# Patient Record
Sex: MALE | Race: WHITE | Employment: FULL TIME | URBAN - METROPOLITAN AREA
[De-identification: names, ages, dates, MRNs, and addresses within clinical notes are randomized per-mention and may not be internally consistent; named-entity substitution may affect disease eponyms.]

---

## 2017-01-22 ENCOUNTER — OFFICE VISIT (OUTPATIENT)
Dept: FAMILY MEDICINE CLINIC | Facility: CLINIC | Age: 62
End: 2017-01-22

## 2017-01-22 VITALS
SYSTOLIC BLOOD PRESSURE: 132 MMHG | HEART RATE: 84 BPM | HEIGHT: 68 IN | BODY MASS INDEX: 28.04 KG/M2 | DIASTOLIC BLOOD PRESSURE: 74 MMHG | RESPIRATION RATE: 21 BRPM | WEIGHT: 185 LBS | OXYGEN SATURATION: 98 % | TEMPERATURE: 98 F

## 2017-01-22 DIAGNOSIS — R05.9 COUGH: ICD-10-CM

## 2017-01-22 DIAGNOSIS — J06.9 UPPER RESPIRATORY TRACT INFECTION, UNSPECIFIED TYPE: Primary | ICD-10-CM

## 2017-01-22 PROCEDURE — 99213 OFFICE O/P EST LOW 20 MIN: CPT | Performed by: PHYSICIAN ASSISTANT

## 2017-01-22 RX ORDER — AZITHROMYCIN 250 MG/1
TABLET, FILM COATED ORAL
Qty: 6 TABLET | Refills: 0 | Status: SHIPPED | OUTPATIENT
Start: 2017-01-22 | End: 2017-06-30 | Stop reason: ALTCHOICE

## 2017-01-22 NOTE — PATIENT INSTRUCTIONS
Adult Self-Care for Colds  Colds are caused by viruses. They can’t be cured with antibiotics. However, you can relieve symptoms and support your body’s efforts to heal itself.  No matter which symptoms you have, be sure to drink plenty of fluids (water or When you first notice symptoms, ask your health care provider if antiviral medications are appropriate. Antibiotics should not be taken for colds or flu.  Also, call your doctor if you have any of the following symptoms or if you aren’t feeling better after

## 2017-01-22 NOTE — PROGRESS NOTES
CHIEF COMPLAINT:   Patient presents with:  Cough: x4 days    HPI:   Renee Shah is a 62year old male who presents for upper and lower respiratory symptoms for  4 days.  Patient reports sore throat only at the beginning of sx's, thick, yellow colore HEAD: atraumatic, normocephalic. No tenderness on palpation of maxillary or frontal sinuses.   EYES: conjunctiva clear, EOM intact  EARS: TM's pearly, no bulging, no retraction, no fluid, bony landmarks appear normal.   NOSE: Nostrils patent, mild, thick n · Breathe steam or heated humidified air to open blocked nasal passages.  a hot shower or use a vaporizer. Be careful not to get burned by the steam.  · Saline nasal sprays and decongestant tablets help open a stuffy nose.  Antihistamines can also h © 3432-9181 [address], [address] [place]. All rights reserved. This information is not intended as a substitute for professional medical care. Always follow your healthcare professional's instructions.               Torsten Graham

## 2017-03-13 RX ORDER — LISINOPRIL 10 MG/1
TABLET ORAL
Qty: 90 TABLET | Refills: 0 | Status: SHIPPED | OUTPATIENT
Start: 2017-03-13 | End: 2017-07-31

## 2017-06-26 ENCOUNTER — TELEPHONE (OUTPATIENT)
Dept: FAMILY MEDICINE CLINIC | Facility: CLINIC | Age: 62
End: 2017-06-26

## 2017-06-26 DIAGNOSIS — Z00.00 ROUTINE PHYSICAL EXAMINATION: Primary | ICD-10-CM

## 2017-06-26 NOTE — TELEPHONE ENCOUNTER
Call to pt reaches voice mail. Left vmm advising requested edward fasting orders will be placed later today. Advised to call back to triage nurse if any other questions.    **see lab orders pended for review-thanks**

## 2017-06-26 NOTE — TELEPHONE ENCOUNTER
Pt currently has a physical scheduled this Friday, 6/30. Pt is asking to complete labs, if needed, prior to the appt. Please advise.

## 2017-06-29 ENCOUNTER — LAB ENCOUNTER (OUTPATIENT)
Dept: LAB | Age: 62
End: 2017-06-29
Attending: FAMILY MEDICINE
Payer: COMMERCIAL

## 2017-06-29 DIAGNOSIS — R73.02 GLUCOSE INTOLERANCE (IMPAIRED GLUCOSE TOLERANCE): Primary | ICD-10-CM

## 2017-06-29 DIAGNOSIS — R79.0 LOW FERRITIN: ICD-10-CM

## 2017-06-29 DIAGNOSIS — Z00.00 ROUTINE PHYSICAL EXAMINATION: ICD-10-CM

## 2017-06-29 DIAGNOSIS — R73.02 GLUCOSE INTOLERANCE (IMPAIRED GLUCOSE TOLERANCE): ICD-10-CM

## 2017-06-29 LAB
ALBUMIN SERPL-MCNC: 3.6 G/DL (ref 3.5–4.8)
ALP LIVER SERPL-CCNC: 29 U/L (ref 45–117)
ALT SERPL-CCNC: 40 U/L (ref 17–63)
AST SERPL-CCNC: 22 U/L (ref 15–41)
BASOPHILS # BLD AUTO: 0.04 X10(3) UL (ref 0–0.1)
BASOPHILS NFR BLD AUTO: 0.8 %
BILIRUB SERPL-MCNC: 0.7 MG/DL (ref 0.1–2)
BUN BLD-MCNC: 13 MG/DL (ref 8–20)
CALCIUM BLD-MCNC: 8.6 MG/DL (ref 8.3–10.3)
CHLORIDE: 107 MMOL/L (ref 101–111)
CHOLEST SMN-MCNC: 223 MG/DL (ref ?–200)
CO2: 25 MMOL/L (ref 22–32)
COMPLEXED PSA SERPL-MCNC: 1.4 NG/ML (ref 0.01–4)
CREAT BLD-MCNC: 0.96 MG/DL (ref 0.7–1.3)
EOSINOPHIL # BLD AUTO: 0.27 X10(3) UL (ref 0–0.3)
EOSINOPHIL NFR BLD AUTO: 5.1 %
ERYTHROCYTE [DISTWIDTH] IN BLOOD BY AUTOMATED COUNT: 12.4 % (ref 11.5–16)
EST. AVERAGE GLUCOSE BLD GHB EST-MCNC: 120 MG/DL (ref 68–126)
GLUCOSE BLD-MCNC: 106 MG/DL (ref 70–99)
HBA1C MFR BLD HPLC: 5.8 % (ref ?–5.7)
HCT VFR BLD AUTO: 43.6 % (ref 37–53)
HDLC SERPL-MCNC: 43 MG/DL (ref 45–?)
HDLC SERPL: 5.19 {RATIO} (ref ?–4.97)
HGB BLD-MCNC: 15 G/DL (ref 13–17)
IMMATURE GRANULOCYTE COUNT: 0.03 X10(3) UL (ref 0–1)
IMMATURE GRANULOCYTE RATIO %: 0.6 %
LDLC SERPL CALC-MCNC: 133 MG/DL (ref ?–130)
LYMPHOCYTES # BLD AUTO: 1.46 X10(3) UL (ref 0.9–4)
LYMPHOCYTES NFR BLD AUTO: 27.7 %
M PROTEIN MFR SERPL ELPH: 7 G/DL (ref 6.1–8.3)
MCH RBC QN AUTO: 31.7 PG (ref 27–33.2)
MCHC RBC AUTO-ENTMCNC: 34.4 G/DL (ref 31–37)
MCV RBC AUTO: 92.2 FL (ref 80–99)
MONOCYTES # BLD AUTO: 0.59 X10(3) UL (ref 0.1–0.6)
MONOCYTES NFR BLD AUTO: 11.2 %
NEUTROPHIL ABS PRELIM: 2.88 X10 (3) UL (ref 1.3–6.7)
NEUTROPHILS # BLD AUTO: 2.88 X10(3) UL (ref 1.3–6.7)
NEUTROPHILS NFR BLD AUTO: 54.6 %
NONHDLC SERPL-MCNC: 180 MG/DL (ref ?–130)
PLATELET # BLD AUTO: 170 10(3)UL (ref 150–450)
POTASSIUM SERPL-SCNC: 4 MMOL/L (ref 3.6–5.1)
RBC # BLD AUTO: 4.73 X10(6)UL (ref 4.3–5.7)
RED CELL DISTRIBUTION WIDTH-SD: 42.1 FL (ref 35.1–46.3)
SODIUM SERPL-SCNC: 141 MMOL/L (ref 136–144)
TRIGLYCERIDES: 236 MG/DL (ref ?–150)
TSI SER-ACNC: 1.71 MIU/ML (ref 0.35–5.5)
VLDL: 47 MG/DL (ref 5–40)
WBC # BLD AUTO: 5.3 X10(3) UL (ref 4–13)

## 2017-06-29 PROCEDURE — 85025 COMPLETE CBC W/AUTO DIFF WBC: CPT

## 2017-06-29 PROCEDURE — 36415 COLL VENOUS BLD VENIPUNCTURE: CPT

## 2017-06-29 PROCEDURE — 82728 ASSAY OF FERRITIN: CPT

## 2017-06-29 PROCEDURE — 80061 LIPID PANEL: CPT

## 2017-06-29 PROCEDURE — 84443 ASSAY THYROID STIM HORMONE: CPT

## 2017-06-29 PROCEDURE — 80053 COMPREHEN METABOLIC PANEL: CPT

## 2017-06-29 PROCEDURE — 83036 HEMOGLOBIN GLYCOSYLATED A1C: CPT

## 2017-06-30 ENCOUNTER — OFFICE VISIT (OUTPATIENT)
Dept: FAMILY MEDICINE CLINIC | Facility: CLINIC | Age: 62
End: 2017-06-30

## 2017-06-30 VITALS
WEIGHT: 186 LBS | BODY MASS INDEX: 28.85 KG/M2 | SYSTOLIC BLOOD PRESSURE: 130 MMHG | TEMPERATURE: 98 F | OXYGEN SATURATION: 99 % | HEIGHT: 67.5 IN | DIASTOLIC BLOOD PRESSURE: 80 MMHG | HEART RATE: 77 BPM | RESPIRATION RATE: 16 BRPM

## 2017-06-30 DIAGNOSIS — Z23 NEED FOR VACCINATION: ICD-10-CM

## 2017-06-30 DIAGNOSIS — Z00.00 ROUTINE GENERAL MEDICAL EXAMINATION AT A HEALTH CARE FACILITY: Primary | ICD-10-CM

## 2017-06-30 DIAGNOSIS — R79.0 LOW FERRITIN: ICD-10-CM

## 2017-06-30 DIAGNOSIS — M25.512 LEFT SHOULDER PAIN, UNSPECIFIED CHRONICITY: ICD-10-CM

## 2017-06-30 LAB — DEPRECATED HBV CORE AB SER IA-ACNC: 48 NG/ML (ref 22–322)

## 2017-06-30 PROCEDURE — 90715 TDAP VACCINE 7 YRS/> IM: CPT | Performed by: FAMILY MEDICINE

## 2017-06-30 PROCEDURE — 99396 PREV VISIT EST AGE 40-64: CPT | Performed by: FAMILY MEDICINE

## 2017-06-30 PROCEDURE — 90471 IMMUNIZATION ADMIN: CPT | Performed by: FAMILY MEDICINE

## 2017-06-30 NOTE — PROGRESS NOTES
CHIEF COMPLAINT   Melba Aragon is a 65year old male who presents for a complete physical exam.   HPI:   Pt complains of left ear clogged. Left shoulder pain again - couple years ago did PT with relief. Pain again for 6 months.       Wt Readings f HISTORY      Comment: Fiberoptic Examinations Gastroscopy   Family History   Problem Relation Age of Onset   • Arthritis Father    • High Blood Pressure Father    • Other[other] [OTHER] Father      spinal stenosis   • High Cholesterol Mother    • Cancer Br intact    ASSESSMENT AND PLAN:   Chris Chris is a 65year old male who presents for a complete physical exam. Pt's weight is There is no height or weight on file to calculate BMI., recommended low fat diet and aerobic exercise 30 minutes three time

## 2017-07-31 RX ORDER — LISINOPRIL 10 MG/1
TABLET ORAL
Qty: 90 TABLET | Refills: 0 | OUTPATIENT
Start: 2017-07-31 | End: 2017-11-13

## 2017-07-31 NOTE — TELEPHONE ENCOUNTER
Received call from [facility] at [address] requesting a refill of pt's lisinopril. Verbal given and documented.

## 2017-10-03 ENCOUNTER — OFFICE VISIT (OUTPATIENT)
Dept: SURGERY | Facility: CLINIC | Age: 62
End: 2017-10-03

## 2017-10-03 VITALS — HEIGHT: 68 IN | TEMPERATURE: 99 F | BODY MASS INDEX: 27.58 KG/M2 | WEIGHT: 182 LBS

## 2017-10-03 DIAGNOSIS — K64.3 PROLAPSED INTERNAL HEMORRHOIDS, GRADE 4: Primary | ICD-10-CM

## 2017-10-03 DIAGNOSIS — K64.4 EXTERNAL PROLAPSED HEMORRHOIDS: ICD-10-CM

## 2017-10-03 DIAGNOSIS — N40.2 PROSTATE NODULE: ICD-10-CM

## 2017-10-03 DIAGNOSIS — K92.2 INTESTINAL BLEEDING: ICD-10-CM

## 2017-10-03 PROCEDURE — 99214 OFFICE O/P EST MOD 30 MIN: CPT | Performed by: COLON & RECTAL SURGERY

## 2017-10-03 PROCEDURE — 46600 DIAGNOSTIC ANOSCOPY SPX: CPT | Performed by: COLON & RECTAL SURGERY

## 2017-10-03 RX ORDER — POLYETHYLENE GLYCOL 3350, SODIUM CHLORIDE, SODIUM BICARBONATE, POTASSIUM CHLORIDE 420; 11.2; 5.72; 1.48 G/4L; G/4L; G/4L; G/4L
POWDER, FOR SOLUTION ORAL
Qty: 1 BOTTLE | Refills: 0 | Status: SHIPPED | OUTPATIENT
Start: 2017-10-03 | End: 2017-10-27

## 2017-10-04 NOTE — PROGRESS NOTES
Follow Up Visit Note       Active Problems      1. Prolapsed internal hemorrhoids, grade 4    2. Prostate nodule    3. External prolapsed hemorrhoids    4.  Intestinal bleeding          Chief Complaint   Patient presents with:  Hemorrhoids: Was seen last fa • Sprain of neck    • Sprain of thoracic region    • Unspecified disorder of skin and subcutaneous tissue      Past Surgical History:  03/13/2009: COLONOSCOPY  03/13/2009: OTHER SURGICAL HISTORY      Comment: Fiberoptic Examinations Gastroscopy    The fa vomiting. Genitourinary: Negative for difficulty urinating, dysuria, frequency and urgency. Musculoskeletal: Negative for arthralgias and myalgias. Skin: Negative for color change and rash. Neurological: Negative for tremors, syncope and weakness. not yet been completed. The patient has had advancing symptoms of his hemorrhoidal disease with prolapse and pressure. He has not had much bleeding since the episodes of last year that were quite profound. He reports no abdominal pain or complaints. a significant amount of hemorrhoidal tissues. We will do our best to remove as much hemorrhoid tissue as it is possible. This may leave him with small amounts of residual hemorrhoidal tissues that should not flare, thrombosed, or prolapse.   We simply can

## 2017-10-04 NOTE — PROCEDURES
Procedure:  Anoscopy    Surgeon: Susi Diallo    Anesthesia: None    Findings: See the progress note attached for all findings    Operative Summary: The patient was placed in a prone position on the proctoscopy table, the hips were flexed in the jackknife p

## 2017-10-04 NOTE — PATIENT INSTRUCTIONS
This patient was seen last fall for a combination of internal/external prolapsing hemorrhoids that will require surgical intervention. At that time the patient was advised to undergo colonoscopy based on a profound bleeding episode.   That study has not seen Dr. Becka Ball in the past, I have informed him that Dr. Becka Ball is retired. His visit with Dr. Becka Ball was possibly even greater than 20 years ago. This patient would definitely benefit from excisional hemorrhoidectomy in 3 locations.   He has a s

## 2017-10-07 PROCEDURE — 87086 URINE CULTURE/COLONY COUNT: CPT | Performed by: UROLOGY

## 2017-10-07 PROCEDURE — 81015 MICROSCOPIC EXAM OF URINE: CPT | Performed by: UROLOGY

## 2017-10-14 ENCOUNTER — APPOINTMENT (OUTPATIENT)
Dept: LAB | Age: 62
End: 2017-10-14
Payer: COMMERCIAL

## 2017-10-14 ENCOUNTER — EKG ENCOUNTER (OUTPATIENT)
Dept: LAB | Age: 62
End: 2017-10-14
Payer: COMMERCIAL

## 2017-10-14 DIAGNOSIS — K64.4 EXTERNAL PROLAPSED HEMORRHOIDS: ICD-10-CM

## 2017-10-14 DIAGNOSIS — K64.3 PROLAPSED INTERNAL HEMORRHOIDS, GRADE 4: ICD-10-CM

## 2017-10-14 DIAGNOSIS — I10 BENIGN ESSENTIAL HYPERTENSION: ICD-10-CM

## 2017-10-14 PROCEDURE — 80048 BASIC METABOLIC PNL TOTAL CA: CPT

## 2017-10-14 PROCEDURE — 93010 ELECTROCARDIOGRAM REPORT: CPT | Performed by: INTERNAL MEDICINE

## 2017-10-14 PROCEDURE — 36415 COLL VENOUS BLD VENIPUNCTURE: CPT

## 2017-10-14 PROCEDURE — 93005 ELECTROCARDIOGRAM TRACING: CPT | Performed by: INTERNAL MEDICINE

## 2017-10-24 ENCOUNTER — LABORATORY ENCOUNTER (OUTPATIENT)
Dept: LAB | Age: 62
End: 2017-10-24
Attending: COLON & RECTAL SURGERY
Payer: COMMERCIAL

## 2017-10-24 DIAGNOSIS — K92.2 INTESTINAL BLEEDING: Primary | ICD-10-CM

## 2017-10-24 PROCEDURE — 88305 TISSUE EXAM BY PATHOLOGIST: CPT

## 2017-10-26 ENCOUNTER — ANESTHESIA EVENT (OUTPATIENT)
Dept: SURGERY | Facility: HOSPITAL | Age: 62
End: 2017-10-26

## 2017-10-27 ENCOUNTER — ANESTHESIA (OUTPATIENT)
Dept: SURGERY | Facility: HOSPITAL | Age: 62
End: 2017-10-27

## 2017-10-27 ENCOUNTER — HOSPITAL ENCOUNTER (OUTPATIENT)
Facility: HOSPITAL | Age: 62
Setting detail: HOSPITAL OUTPATIENT SURGERY
Discharge: HOME | End: 2017-10-27
Attending: COLON & RECTAL SURGERY | Admitting: COLON & RECTAL SURGERY
Payer: COMMERCIAL

## 2017-10-27 ENCOUNTER — SURGERY (OUTPATIENT)
Age: 62
End: 2017-10-27

## 2017-10-27 VITALS
HEART RATE: 62 BPM | WEIGHT: 183.31 LBS | OXYGEN SATURATION: 95 % | SYSTOLIC BLOOD PRESSURE: 120 MMHG | BODY MASS INDEX: 27.78 KG/M2 | TEMPERATURE: 98 F | HEIGHT: 68 IN | RESPIRATION RATE: 18 BRPM | DIASTOLIC BLOOD PRESSURE: 80 MMHG

## 2017-10-27 DIAGNOSIS — I10 BENIGN ESSENTIAL HYPERTENSION: Primary | ICD-10-CM

## 2017-10-27 DIAGNOSIS — K64.3 PROLAPSED INTERNAL HEMORRHOIDS, GRADE 4: ICD-10-CM

## 2017-10-27 DIAGNOSIS — K64.4 EXTERNAL PROLAPSED HEMORRHOIDS: ICD-10-CM

## 2017-10-27 PROCEDURE — 88304 TISSUE EXAM BY PATHOLOGIST: CPT | Performed by: COLON & RECTAL SURGERY

## 2017-10-27 PROCEDURE — 06BY0ZC EXCISION OF HEMORRHOIDAL PLEXUS, OPEN APPROACH: ICD-10-PCS | Performed by: COLON & RECTAL SURGERY

## 2017-10-27 RX ORDER — HYDROMORPHONE HYDROCHLORIDE 1 MG/ML
0.4 INJECTION, SOLUTION INTRAMUSCULAR; INTRAVENOUS; SUBCUTANEOUS EVERY 5 MIN PRN
Status: DISCONTINUED | OUTPATIENT
Start: 2017-10-27 | End: 2017-10-27

## 2017-10-27 RX ORDER — CLINDAMYCIN PHOSPHATE 900 MG/50ML
INJECTION INTRAVENOUS
Status: DISCONTINUED | OUTPATIENT
Start: 2017-10-27 | End: 2017-10-27

## 2017-10-27 RX ORDER — HEPARIN SODIUM 5000 [USP'U]/ML
5000 INJECTION, SOLUTION INTRAVENOUS; SUBCUTANEOUS ONCE
Status: COMPLETED | OUTPATIENT
Start: 2017-10-27 | End: 2017-10-27

## 2017-10-27 RX ORDER — BUPIVACAINE HYDROCHLORIDE AND EPINEPHRINE 5; 5 MG/ML; UG/ML
INJECTION, SOLUTION EPIDURAL; INTRACAUDAL; PERINEURAL AS NEEDED
Status: DISCONTINUED | OUTPATIENT
Start: 2017-10-27 | End: 2017-10-27 | Stop reason: HOSPADM

## 2017-10-27 RX ORDER — HYDROCODONE BITARTRATE AND ACETAMINOPHEN 5; 325 MG/1; MG/1
2 TABLET ORAL AS NEEDED
Status: COMPLETED | OUTPATIENT
Start: 2017-10-27 | End: 2017-10-27

## 2017-10-27 RX ORDER — ONDANSETRON 2 MG/ML
4 INJECTION INTRAMUSCULAR; INTRAVENOUS AS NEEDED
Status: DISCONTINUED | OUTPATIENT
Start: 2017-10-27 | End: 2017-10-27

## 2017-10-27 RX ORDER — NALOXONE HYDROCHLORIDE 0.4 MG/ML
80 INJECTION, SOLUTION INTRAMUSCULAR; INTRAVENOUS; SUBCUTANEOUS AS NEEDED
Status: DISCONTINUED | OUTPATIENT
Start: 2017-10-27 | End: 2017-10-27

## 2017-10-27 RX ORDER — HEPARIN SODIUM 5000 [USP'U]/ML
INJECTION, SOLUTION INTRAVENOUS; SUBCUTANEOUS
Status: DISCONTINUED
Start: 2017-10-27 | End: 2017-10-27

## 2017-10-27 RX ORDER — HYDROMORPHONE HYDROCHLORIDE 1 MG/ML
INJECTION, SOLUTION INTRAMUSCULAR; INTRAVENOUS; SUBCUTANEOUS
Status: COMPLETED
Start: 2017-10-27 | End: 2017-10-27

## 2017-10-27 RX ORDER — SODIUM CHLORIDE, SODIUM LACTATE, POTASSIUM CHLORIDE, CALCIUM CHLORIDE 600; 310; 30; 20 MG/100ML; MG/100ML; MG/100ML; MG/100ML
INJECTION, SOLUTION INTRAVENOUS CONTINUOUS
Status: DISCONTINUED | OUTPATIENT
Start: 2017-10-27 | End: 2017-10-27

## 2017-10-27 RX ORDER — HYDROCODONE BITARTRATE AND ACETAMINOPHEN 5; 325 MG/1; MG/1
1 TABLET ORAL EVERY 4 HOURS PRN
Qty: 40 TABLET | Refills: 0 | Status: SHIPPED | OUTPATIENT
Start: 2017-10-27 | End: 2017-11-16 | Stop reason: ALTCHOICE

## 2017-10-27 RX ORDER — HYDROCODONE BITARTRATE AND ACETAMINOPHEN 5; 325 MG/1; MG/1
1 TABLET ORAL AS NEEDED
Status: COMPLETED | OUTPATIENT
Start: 2017-10-27 | End: 2017-10-27

## 2017-10-27 RX ORDER — CLINDAMYCIN PHOSPHATE 900 MG/50ML
900 INJECTION INTRAVENOUS ONCE
Status: DISCONTINUED | OUTPATIENT
Start: 2017-10-27 | End: 2017-10-27 | Stop reason: HOSPADM

## 2017-10-27 RX ORDER — METOCLOPRAMIDE HYDROCHLORIDE 5 MG/ML
10 INJECTION INTRAMUSCULAR; INTRAVENOUS AS NEEDED
Status: DISCONTINUED | OUTPATIENT
Start: 2017-10-27 | End: 2017-10-27

## 2017-10-27 NOTE — H&P
Active Problems      1. Prolapsed internal hemorrhoids, grade 4    2. Prostate nodule    3. External prolapsed hemorrhoids    4.  Intestinal bleeding          Chief Complaint   Patient presents with:  Hemorrhoids: Was seen last fall and had to put off surge Cancer Brother         tongue      Social History    Marital status:              Spouse name:                       Years of education:                 Number of children:                Social History Main Topics    Smoking status: Never Smoker Genitourinary: Rectal exam shows external hemorrhoid and internal hemorrhoid. Rectal exam shows no fissure, no mass, no tenderness and anal tone normal. Prostate is not enlarged and not tender.    Genitourinary Comments: Prostate Nodule  Anal Sphincter In external hemorrhoids with essentially grade 4 internal hemorrhoids on top of external chronically prolapsed hemorrhoids. The patient has no current proctitis or Crohn's disease. He has no anal fissures. He has no abscess.   The patient has a normal prost

## 2017-10-27 NOTE — ANESTHESIA PREPROCEDURE EVALUATION
PRE-OP EVALUATION    Patient Name: Michael Shannon    Pre-op Diagnosis: Prolapsed internal hemorrhoids, grade 4 [K64.3]    Procedure(s):  EXCISIONAL HEMORRHOIDECTOMY IN THREE LOCATIONS     Surgeon(s) and Role:     Juanito Blanchard MD - Primary    Pre-o Comment: Fiberoptic Examinations Gastroscopy     Smoking status: Never Smoker    Smokeless tobacco: Never Used    Alcohol use Yes  0.6 - 1.2 oz/week    1 - 2 Standard drinks or equivalent per week            Drug use: No     Available pre-op labs reviewed.

## 2017-10-27 NOTE — OPERATIVE REPORT
[facility]  Op Note    Doyce Popper Location: OR   CSN [id number] MRN [id number]   Admission Date 10/27/2017 Operation Date 10/27/2017   Attending Physician Ramos Herbert MD Operating Physician Izabella Lee MD     Pre-Operative Diagnosis: Carolina Gomez junction and the anoderm mucosal junction. Reinforcing sutures of 2-0 Vicryl were used where necessary to provide further hemostasis and to strengthen the suture line.     Once all hemorrhoids were treated in this fashion, all sponge counts and instrumen

## 2017-10-27 NOTE — ANESTHESIA POSTPROCEDURE EVALUATION
[address] Patient Status:  Hospital Outpatient Surgery   Age/Gender 65year old male MRN [id number]   [facility] SURGERY Attending Maximilian Cardozo MD   Hosp Day # 0 PCP Radha San MD       Anesthesia Post-op

## 2017-10-30 ENCOUNTER — TELEPHONE (OUTPATIENT)
Dept: SURGERY | Facility: CLINIC | Age: 62
End: 2017-10-30

## 2017-10-30 NOTE — TELEPHONE ENCOUNTER
Pt called stating had surgery on Friday and did not have a bowel movement yet, states has not taken MOM that was prescribed post op. Instructed to take MOM today and start taking daily colace and ensure ambulating as tolerated.   Pt instructed to call if s

## 2017-10-31 ENCOUNTER — TELEPHONE (OUTPATIENT)
Dept: SURGERY | Facility: CLINIC | Age: 62
End: 2017-10-31

## 2017-10-31 NOTE — TELEPHONE ENCOUNTER
Pt called and stated has not had a BM since his surgery on Friday. Pt states already taking colace and MOM, discussed with PA and she stated he should just continue what he is taking. Explained to pt, pt stated good understanding.

## 2017-11-01 ENCOUNTER — TELEPHONE (OUTPATIENT)
Dept: SURGERY | Facility: CLINIC | Age: 62
End: 2017-11-01

## 2017-11-01 NOTE — TELEPHONE ENCOUNTER
Pt had hemorrhoidectomy 5 days ago and states he is having a large amount of bloody drainage that just comes out and he is concerned.  He states he does not know exactly what it is, but there is something there besides blood - fibrous material. He also stat

## 2017-11-02 ENCOUNTER — MED REC SCAN ONLY (OUTPATIENT)
Dept: FAMILY MEDICINE CLINIC | Facility: CLINIC | Age: 62
End: 2017-11-02

## 2017-11-02 ENCOUNTER — OFFICE VISIT (OUTPATIENT)
Dept: SURGERY | Facility: CLINIC | Age: 62
End: 2017-11-02

## 2017-11-02 VITALS
HEIGHT: 68 IN | DIASTOLIC BLOOD PRESSURE: 76 MMHG | TEMPERATURE: 98 F | HEART RATE: 97 BPM | RESPIRATION RATE: 17 BRPM | WEIGHT: 183.38 LBS | BODY MASS INDEX: 27.79 KG/M2 | SYSTOLIC BLOOD PRESSURE: 135 MMHG

## 2017-11-02 DIAGNOSIS — K64.3 PROLAPSED INTERNAL HEMORRHOIDS, GRADE 4: ICD-10-CM

## 2017-11-02 DIAGNOSIS — K92.2 INTESTINAL BLEEDING: ICD-10-CM

## 2017-11-02 DIAGNOSIS — K64.4 EXTERNAL PROLAPSED HEMORRHOIDS: Primary | ICD-10-CM

## 2017-11-02 PROCEDURE — 99024 POSTOP FOLLOW-UP VISIT: CPT | Performed by: PHYSICIAN ASSISTANT

## 2017-11-02 RX ORDER — IBUPROFEN 200 MG
200 TABLET ORAL AS NEEDED
COMMUNITY
End: 2018-06-14 | Stop reason: ALTCHOICE

## 2017-11-02 NOTE — PROGRESS NOTES
Post Operative Visit Note       Active Problems  1. External prolapsed hemorrhoids    2. Intestinal bleeding    3.  Prolapsed internal hemorrhoids, grade 4         Chief Complaint   Patient presents with:  Anal Problem (GI): Trouble urinating, painful bowel HISTORY      Comment: Fiberoptic Examinations Gastroscopy    The family history and social history have been reviewed by me today.     Family History   Problem Relation Age of Onset   • Arthritis Father    • High Blood Pressure Father    • Other[other] [OTH normal and breath sounds normal. No respiratory distress. Abdominal: Soft. Bowel sounds are normal. He exhibits no distension and no mass. There is no tenderness. There is no rebound and no guarding.    Genitourinary:   Genitourinary Comments: Examination if they have any questions or concerns prior to their scheduled appointment they may contact our office. No orders of the defined types were placed in this encounter.       Imaging & Referrals   None    Follow Up  Return in about 2 weeks (around 11/

## 2017-11-13 RX ORDER — LISINOPRIL 10 MG/1
TABLET ORAL
Qty: 90 TABLET | Refills: 0 | Status: SHIPPED | OUTPATIENT
Start: 2017-11-13 | End: 2018-02-20

## 2017-11-16 ENCOUNTER — OFFICE VISIT (OUTPATIENT)
Dept: SURGERY | Facility: CLINIC | Age: 62
End: 2017-11-16

## 2017-11-16 VITALS
WEIGHT: 183 LBS | HEART RATE: 76 BPM | TEMPERATURE: 98 F | BODY MASS INDEX: 28 KG/M2 | DIASTOLIC BLOOD PRESSURE: 79 MMHG | SYSTOLIC BLOOD PRESSURE: 119 MMHG

## 2017-11-16 DIAGNOSIS — K64.3 PROLAPSED INTERNAL HEMORRHOIDS, GRADE 4: Primary | ICD-10-CM

## 2017-11-16 DIAGNOSIS — K64.4 EXTERNAL PROLAPSED HEMORRHOIDS: ICD-10-CM

## 2017-11-16 PROCEDURE — 99024 POSTOP FOLLOW-UP VISIT: CPT | Performed by: COLON & RECTAL SURGERY

## 2017-11-16 NOTE — PROGRESS NOTES
Follow Up Visit Note       Active Problems      1. Prolapsed internal hemorrhoids, grade 4    2.  External prolapsed hemorrhoids          Chief Complaint   Patient presents with:  Hemorrhoids: 10/27 hemorrhoidectomy,  Better but still not back to work,  OTC Smoker                                                                Smokeless tobacco: Never Used                        Alcohol use: Yes           0.6 - 1.2 oz/week       Standard drinks or equivalent: 1 - 2 per week    Drug use: No            Other Top I did remove some suture material externally. Digital rectal exam reveals good sphincter control. There is still significant granulation tissue at the healing sites on the mucosal surfaces. There is no evidence of cellulitis or abscess.   There are no co if symptoms worsen or fail to improve.     Izabella Lee MD

## 2017-11-21 NOTE — PATIENT INSTRUCTIONS
This patient underwent excisional hemorrhoidectomy on October 27, 2017. He is getting better, but has not returned to work. He is on over-the-counter pain medication and taking Colace. The patient states that he feels constipated.   He still feels some

## 2018-01-13 ENCOUNTER — OFFICE VISIT (OUTPATIENT)
Dept: FAMILY MEDICINE CLINIC | Facility: CLINIC | Age: 63
End: 2018-01-13

## 2018-01-13 VITALS
OXYGEN SATURATION: 97 % | TEMPERATURE: 98 F | HEART RATE: 70 BPM | BODY MASS INDEX: 27.28 KG/M2 | HEIGHT: 68 IN | WEIGHT: 180 LBS | SYSTOLIC BLOOD PRESSURE: 140 MMHG | DIASTOLIC BLOOD PRESSURE: 76 MMHG

## 2018-01-13 DIAGNOSIS — H61.23 BILATERAL IMPACTED CERUMEN: Primary | ICD-10-CM

## 2018-01-13 PROCEDURE — 69209 REMOVE IMPACTED EAR WAX UNI: CPT | Performed by: NURSE PRACTITIONER

## 2018-01-31 ENCOUNTER — LAB ENCOUNTER (OUTPATIENT)
Dept: LAB | Facility: HOSPITAL | Age: 63
End: 2018-01-31
Attending: NURSE PRACTITIONER
Payer: COMMERCIAL

## 2018-01-31 ENCOUNTER — OFFICE VISIT (OUTPATIENT)
Dept: FAMILY MEDICINE CLINIC | Facility: CLINIC | Age: 63
End: 2018-01-31

## 2018-01-31 ENCOUNTER — HOSPITAL ENCOUNTER (OUTPATIENT)
Dept: GENERAL RADIOLOGY | Age: 63
Discharge: HOME | End: 2018-01-31
Attending: NURSE PRACTITIONER
Payer: COMMERCIAL

## 2018-01-31 VITALS
HEIGHT: 68 IN | TEMPERATURE: 98 F | BODY MASS INDEX: 27.89 KG/M2 | SYSTOLIC BLOOD PRESSURE: 104 MMHG | HEART RATE: 82 BPM | DIASTOLIC BLOOD PRESSURE: 68 MMHG | WEIGHT: 184 LBS | RESPIRATION RATE: 18 BRPM | OXYGEN SATURATION: 98 %

## 2018-01-31 DIAGNOSIS — R05.9 COUGH: ICD-10-CM

## 2018-01-31 DIAGNOSIS — J10.1 INFLUENZA B: Primary | ICD-10-CM

## 2018-01-31 DIAGNOSIS — I10 HYPERTENSION, UNSPECIFIED TYPE: ICD-10-CM

## 2018-01-31 DIAGNOSIS — J02.9 SORE THROAT: ICD-10-CM

## 2018-01-31 DIAGNOSIS — I77.1 TORTUOUS AORTA (HCC): ICD-10-CM

## 2018-01-31 LAB — CONTROL LINE PRESENT WITH A CLEAR BACKGROUND (YES/NO): YES YES/NO

## 2018-01-31 PROCEDURE — 87999 UNLISTED MICROBIOLOGY PX: CPT

## 2018-01-31 PROCEDURE — 87081 CULTURE SCREEN ONLY: CPT | Performed by: NURSE PRACTITIONER

## 2018-01-31 PROCEDURE — 87798 DETECT AGENT NOS DNA AMP: CPT

## 2018-01-31 PROCEDURE — 87502 INFLUENZA DNA AMP PROBE: CPT

## 2018-01-31 PROCEDURE — 99213 OFFICE O/P EST LOW 20 MIN: CPT | Performed by: NURSE PRACTITIONER

## 2018-01-31 PROCEDURE — 71046 X-RAY EXAM CHEST 2 VIEWS: CPT | Performed by: NURSE PRACTITIONER

## 2018-01-31 PROCEDURE — 87880 STREP A ASSAY W/OPTIC: CPT | Performed by: NURSE PRACTITIONER

## 2018-01-31 RX ORDER — OSELTAMIVIR PHOSPHATE 75 MG/1
75 CAPSULE ORAL 2 TIMES DAILY
Qty: 10 CAPSULE | Refills: 0 | Status: SHIPPED | OUTPATIENT
Start: 2018-01-31 | End: 2018-02-05

## 2018-01-31 NOTE — PROGRESS NOTES
Ruth Smallwood is a 65year old male. HPI:   Patient presents today reporting a 1 day history of a productive cough, sinus pressure, chills, body aches and sore throat.  He does not think he has had a fever at home-but reports 2 episodes of feeling wa productive cough.   CARDIOVASCULAR: denies chest pain on exertion  GI: denies abdominal pain  NEURO: denies headaches  EXAM:   /68   Pulse 82   Temp 98.3 °F (36.8 °C) (Oral)   Resp 18   Ht 68\"   Wt 184 lb   SpO2 98%   BMI 27.98 kg/m²   GENERAL: well Thoracic Aorta ordered- tortuous thoracic aorta noted on CXR.  - CTA GATED THORACIC AORTA (CPT=71275); Future      The patient indicates understanding of these issues and agrees to the plan.  The patient is asked to return if symptoms worsen or fail to impr

## 2018-02-22 RX ORDER — LISINOPRIL 10 MG/1
TABLET ORAL
Qty: 90 TABLET | Refills: 0 | Status: SHIPPED | OUTPATIENT
Start: 2018-02-22 | End: 2018-06-14

## 2018-02-23 RX ORDER — LISINOPRIL 10 MG/1
TABLET ORAL
Qty: 90 TABLET | Refills: 0 | OUTPATIENT
Start: 2018-02-23

## 2018-06-14 ENCOUNTER — OFFICE VISIT (OUTPATIENT)
Dept: FAMILY MEDICINE CLINIC | Facility: CLINIC | Age: 63
End: 2018-06-14

## 2018-06-14 VITALS
HEIGHT: 68 IN | HEART RATE: 68 BPM | DIASTOLIC BLOOD PRESSURE: 70 MMHG | TEMPERATURE: 99 F | SYSTOLIC BLOOD PRESSURE: 110 MMHG | BODY MASS INDEX: 27.74 KG/M2 | RESPIRATION RATE: 12 BRPM | WEIGHT: 183 LBS

## 2018-06-14 DIAGNOSIS — M54.9 MID BACK PAIN: Primary | ICD-10-CM

## 2018-06-14 PROCEDURE — 99213 OFFICE O/P EST LOW 20 MIN: CPT | Performed by: FAMILY MEDICINE

## 2018-06-14 RX ORDER — NAPROXEN 500 MG/1
500 TABLET ORAL 2 TIMES DAILY WITH MEALS
Qty: 30 TABLET | Refills: 0 | Status: SHIPPED | OUTPATIENT
Start: 2018-06-14 | End: 2018-08-14

## 2018-06-14 RX ORDER — LISINOPRIL 10 MG/1
TABLET ORAL
Qty: 90 TABLET | Refills: 1 | Status: SHIPPED | OUTPATIENT
Start: 2018-06-14 | End: 2018-07-05

## 2018-06-14 NOTE — PROGRESS NOTES
Bing Potter is a 59year old male. CHIEF COMPLAINT   Mid back pain  HPI:   Mid back pain. About 3 weeks ago lifted a patio table - felt something pull in back. Felt a \"pull\" in the area for about 7-10 days - was icing and taking ibuprofen.   Lynne Wesley auscultation  CARDIO: RRR without murmur  GI: good BS's,no masses, HSM or tenderness. No CVA tenderness. EXTREMITIES: no cyanosis, clubbing or edema  BACK:  Mildly tender left lower thoracic/left upper lumbar paravertebral muscles. No spasm noted.    AS

## 2018-07-05 ENCOUNTER — LAB ENCOUNTER (OUTPATIENT)
Dept: LAB | Age: 63
End: 2018-07-05
Attending: FAMILY MEDICINE
Payer: COMMERCIAL

## 2018-07-05 ENCOUNTER — OFFICE VISIT (OUTPATIENT)
Dept: FAMILY MEDICINE CLINIC | Facility: CLINIC | Age: 63
End: 2018-07-05

## 2018-07-05 VITALS
SYSTOLIC BLOOD PRESSURE: 138 MMHG | DIASTOLIC BLOOD PRESSURE: 88 MMHG | RESPIRATION RATE: 16 BRPM | HEART RATE: 72 BPM | TEMPERATURE: 98 F | HEIGHT: 68 IN | WEIGHT: 162 LBS | BODY MASS INDEX: 24.55 KG/M2 | OXYGEN SATURATION: 99 %

## 2018-07-05 DIAGNOSIS — Z13.89 SCREENING FOR GENITOURINARY CONDITION: ICD-10-CM

## 2018-07-05 DIAGNOSIS — Z12.5 SCREENING FOR PROSTATE CANCER: ICD-10-CM

## 2018-07-05 DIAGNOSIS — G89.29 CHRONIC LEFT SHOULDER PAIN: ICD-10-CM

## 2018-07-05 DIAGNOSIS — Z00.00 ROUTINE GENERAL MEDICAL EXAMINATION AT HEALTH CARE FACILITY: Primary | ICD-10-CM

## 2018-07-05 DIAGNOSIS — R79.89 LOW TESTOSTERONE: ICD-10-CM

## 2018-07-05 DIAGNOSIS — M25.512 CHRONIC LEFT SHOULDER PAIN: ICD-10-CM

## 2018-07-05 DIAGNOSIS — Z00.00 ROUTINE GENERAL MEDICAL EXAMINATION AT HEALTH CARE FACILITY: ICD-10-CM

## 2018-07-05 LAB
25-HYDROXYVITAMIN D (TOTAL): 17.7 NG/ML (ref 30–100)
ALBUMIN SERPL-MCNC: 3.8 G/DL (ref 3.5–4.8)
ALP LIVER SERPL-CCNC: 29 U/L (ref 45–117)
ALT SERPL-CCNC: 36 U/L (ref 17–63)
AST SERPL-CCNC: 21 U/L (ref 15–41)
BASOPHILS # BLD AUTO: 0.03 X10(3) UL (ref 0–0.1)
BASOPHILS NFR BLD AUTO: 0.7 %
BILIRUB SERPL-MCNC: 0.9 MG/DL (ref 0.1–2)
BILIRUB UR QL STRIP.AUTO: NEGATIVE
BUN BLD-MCNC: 17 MG/DL (ref 8–20)
CALCIUM BLD-MCNC: 9.1 MG/DL (ref 8.3–10.3)
CHLORIDE: 107 MMOL/L (ref 101–111)
CHOLEST SMN-MCNC: 209 MG/DL (ref ?–200)
CLARITY UR REFRACT.AUTO: CLEAR
CO2: 26 MMOL/L (ref 22–32)
COLOR UR AUTO: YELLOW
COMPLEXED PSA SERPL-MCNC: 1.65 NG/ML (ref 0.01–4)
CREAT BLD-MCNC: 0.98 MG/DL (ref 0.7–1.3)
EOSINOPHIL # BLD AUTO: 0.25 X10(3) UL (ref 0–0.3)
EOSINOPHIL NFR BLD AUTO: 6.1 %
ERYTHROCYTE [DISTWIDTH] IN BLOOD BY AUTOMATED COUNT: 13.2 % (ref 11.5–16)
GLUCOSE BLD-MCNC: 100 MG/DL (ref 70–99)
GLUCOSE UR STRIP.AUTO-MCNC: NEGATIVE MG/DL
HCT VFR BLD AUTO: 44.1 % (ref 37–53)
HDLC SERPL-MCNC: 53 MG/DL (ref 45–?)
HDLC SERPL: 3.94 {RATIO} (ref ?–4.97)
HGB BLD-MCNC: 13.8 G/DL (ref 13–17)
IMMATURE GRANULOCYTE COUNT: 0.01 X10(3) UL (ref 0–1)
IMMATURE GRANULOCYTE RATIO %: 0.2 %
KETONES UR STRIP.AUTO-MCNC: NEGATIVE MG/DL
LDLC SERPL CALC-MCNC: 135 MG/DL (ref ?–130)
LEUKOCYTE ESTERASE UR QL STRIP.AUTO: NEGATIVE
LYMPHOCYTES # BLD AUTO: 1.06 X10(3) UL (ref 0.9–4)
LYMPHOCYTES NFR BLD AUTO: 25.9 %
M PROTEIN MFR SERPL ELPH: 7.6 G/DL (ref 6.1–8.3)
MCH RBC QN AUTO: 29.9 PG (ref 27–33.2)
MCHC RBC AUTO-ENTMCNC: 31.3 G/DL (ref 31–37)
MCV RBC AUTO: 95.7 FL (ref 80–99)
MONOCYTES # BLD AUTO: 0.45 X10(3) UL (ref 0.1–1)
MONOCYTES NFR BLD AUTO: 11 %
NEUTROPHIL ABS PRELIM: 2.3 X10 (3) UL (ref 1.3–6.7)
NEUTROPHILS # BLD AUTO: 2.3 X10(3) UL (ref 1.3–6.7)
NEUTROPHILS NFR BLD AUTO: 56.1 %
NITRITE UR QL STRIP.AUTO: NEGATIVE
NONHDLC SERPL-MCNC: 156 MG/DL (ref ?–130)
PH UR STRIP.AUTO: 5 [PH] (ref 4.5–8)
PLATELET # BLD AUTO: 190 10(3)UL (ref 150–450)
POTASSIUM SERPL-SCNC: 4.7 MMOL/L (ref 3.6–5.1)
PROT UR STRIP.AUTO-MCNC: NEGATIVE MG/DL
RBC # BLD AUTO: 4.61 X10(6)UL (ref 4.3–5.7)
RBC UR QL AUTO: NEGATIVE
RED CELL DISTRIBUTION WIDTH-SD: 46.5 FL (ref 35.1–46.3)
SODIUM SERPL-SCNC: 142 MMOL/L (ref 136–144)
SP GR UR STRIP.AUTO: 1.02 (ref 1–1.03)
TESTOSTERONE: 319.5 NG/DL (ref 241–827)
TRIGL SERPL-MCNC: 106 MG/DL (ref ?–150)
TSI SER-ACNC: 1.47 MIU/ML (ref 0.35–5.5)
UROBILINOGEN UR STRIP.AUTO-MCNC: <2 MG/DL
VLDLC SERPL CALC-MCNC: 21 MG/DL (ref 5–40)
WBC # BLD AUTO: 4.1 X10(3) UL (ref 4–13)

## 2018-07-05 PROCEDURE — 85025 COMPLETE CBC W/AUTO DIFF WBC: CPT

## 2018-07-05 PROCEDURE — 80053 COMPREHEN METABOLIC PANEL: CPT

## 2018-07-05 PROCEDURE — 36415 COLL VENOUS BLD VENIPUNCTURE: CPT

## 2018-07-05 PROCEDURE — 80061 LIPID PANEL: CPT

## 2018-07-05 PROCEDURE — 99396 PREV VISIT EST AGE 40-64: CPT | Performed by: FAMILY MEDICINE

## 2018-07-05 PROCEDURE — 84403 ASSAY OF TOTAL TESTOSTERONE: CPT

## 2018-07-05 PROCEDURE — 84443 ASSAY THYROID STIM HORMONE: CPT

## 2018-07-05 PROCEDURE — 81003 URINALYSIS AUTO W/O SCOPE: CPT

## 2018-07-05 PROCEDURE — 82306 VITAMIN D 25 HYDROXY: CPT

## 2018-07-05 RX ORDER — LISINOPRIL 10 MG/1
TABLET ORAL
Qty: 90 TABLET | Refills: 1 | COMMUNITY
Start: 2018-07-05 | End: 2019-08-02

## 2018-07-05 NOTE — PROGRESS NOTES
CHIEF COMPLAINT   Mark Zuñiga is a 59year old male who presents for a complete physical exam.   HPI:   BPs outside of office good. Left shoulder pain for several years - did PT in the past with relief - would like to do again.    Difficulty main region    • Unspecified disorder of skin and subcutaneous tissue       Past Surgical History:  03/13/2009: COLONOSCOPY  10/2017: HEMORRHOIDECTOMY  No date: OTHER      Comment: [place] teeth  03/13/2009: OTHER SURGICAL HISTORY      Comment: Fiberoptic Examin hernia, no penile lesions. Prostate smooth. Nontender. Symmetric. Mildly enlarged prostate.    MUSCULOSKELETAL: back is not tender,FROM of the back  EXTREMITIES: no cyanosis, clubbing or edema  NEURO: Oriented times three,cranial nerves are grossly intact

## 2018-07-06 DIAGNOSIS — E55.9 VITAMIN D DEFICIENCY: Primary | ICD-10-CM

## 2018-07-06 RX ORDER — ERGOCALCIFEROL 1.25 MG/1
50000 CAPSULE ORAL WEEKLY
Qty: 8 CAPSULE | Refills: 0 | Status: SHIPPED | OUTPATIENT
Start: 2018-07-06 | End: 2019-08-02 | Stop reason: ALTCHOICE

## 2018-08-16 RX ORDER — NAPROXEN 500 MG/1
TABLET ORAL
Qty: 30 TABLET | Refills: 0 | Status: SHIPPED | OUTPATIENT
Start: 2018-08-16 | End: 2019-08-02

## 2018-11-27 ENCOUNTER — OFFICE VISIT (OUTPATIENT)
Dept: PHYSICAL THERAPY | Age: 63
End: 2018-11-27
Attending: FAMILY MEDICINE
Payer: COMMERCIAL

## 2018-11-27 DIAGNOSIS — G89.29 CHRONIC LEFT SHOULDER PAIN: ICD-10-CM

## 2018-11-27 DIAGNOSIS — M25.512 CHRONIC LEFT SHOULDER PAIN: ICD-10-CM

## 2018-11-27 PROCEDURE — 97162 PT EVAL MOD COMPLEX 30 MIN: CPT

## 2018-11-27 PROCEDURE — 97110 THERAPEUTIC EXERCISES: CPT

## 2018-11-27 NOTE — PROGRESS NOTES
UPPER EXTREMITY EVALUATION:   Referring Physician: Dr. Giorgio Kearns  Diagnosis: Left RC dysfunction with impingement      Date of Service: 11/27/2018     PATIENT SUMMARY   Shahram Damian is a 59year old y/o male who presents to therapy today with compla disorder of skin and subcutaneous tissue        ASSESSMENT  Rich presents to physical therapy with signs and symptoms consistent with rotator cuff dysfunction secondary to underlying and chronic subacromial impingement.  Clinical findings include pain with to decrease irritation to the shoulder joint. I demo and explained purpose of each HEP.   Patient was instructed in and issued a HEP for: YTB IR and ER and scapular retraction  Charges: PT Eval Moderate Complexity, [place] 1       Total Timed Treatment: 45 min

## 2018-11-29 ENCOUNTER — OFFICE VISIT (OUTPATIENT)
Dept: PHYSICAL THERAPY | Age: 63
End: 2018-11-29
Attending: FAMILY MEDICINE
Payer: COMMERCIAL

## 2018-11-29 PROCEDURE — 97140 MANUAL THERAPY 1/> REGIONS: CPT

## 2018-11-29 PROCEDURE — 97112 NEUROMUSCULAR REEDUCATION: CPT

## 2018-11-29 PROCEDURE — 97110 THERAPEUTIC EXERCISES: CPT

## 2018-11-29 NOTE — PROGRESS NOTES
Dx:  Left RC dysfunction with impingement             Authorized # of Visits:  12         Next MD visit: none scheduled  Fall Risk: standard         Precautions: n/a           Subjective: shoulder is a little sore, 2/10, but he thinks it was because of doi demo, tactile cues and verbal cues           Closed chain wall push up plus - max cues 10 x          STM to upper trap and suprascapular fossa to improve blood flow for healing x 8 min   AC G II-III joint mob 10 x   Inf GH mob G II-III 10 x 2

## 2018-12-04 ENCOUNTER — OFFICE VISIT (OUTPATIENT)
Dept: PHYSICAL THERAPY | Age: 63
End: 2018-12-04
Attending: FAMILY MEDICINE
Payer: COMMERCIAL

## 2018-12-04 PROCEDURE — 97112 NEUROMUSCULAR REEDUCATION: CPT

## 2018-12-04 PROCEDURE — 97110 THERAPEUTIC EXERCISES: CPT

## 2018-12-04 NOTE — PROGRESS NOTES
Dx:  Left RC dysfunction with impingement             Authorized # of Visits:  12         Next MD visit: none scheduled  Fall Risk: standard         Precautions: n/a           Subjective: had some aching/throbbing soreness in the shoulder on the way home f shoulder with light random perturbations all directions 30 sec x 3  Hook lying protracted shoulder with light random perturbations all directions 30 sec x 5  Progressed intensity         scap retraction cueing x 4 min, demo, tactile cues and verbal cues

## 2018-12-06 ENCOUNTER — APPOINTMENT (OUTPATIENT)
Dept: PHYSICAL THERAPY | Age: 63
End: 2018-12-06
Attending: FAMILY MEDICINE
Payer: COMMERCIAL

## 2018-12-13 ENCOUNTER — OFFICE VISIT (OUTPATIENT)
Dept: PHYSICAL THERAPY | Age: 63
End: 2018-12-13
Attending: FAMILY MEDICINE
Payer: COMMERCIAL

## 2018-12-13 PROCEDURE — 97110 THERAPEUTIC EXERCISES: CPT

## 2018-12-13 PROCEDURE — 97112 NEUROMUSCULAR REEDUCATION: CPT

## 2018-12-13 NOTE — PROGRESS NOTES
Dx:  Left RC dysfunction with impingement             Authorized # of Visits:  12         Next MD visit: none scheduled  Fall Risk: standard         Precautions: n/a           Subjective: had soreness post last session, but otherwise was pain free all week steps YTB 10  X 2  Wall slides with RTB for resistance into scap retraction 10 x 3 sets   Lateral steps YTB 10  X 2       Hook lying serratus punch 10 x 3 7 lbs  Hook lying serratus punch 10 x 3 10 lbs  Hook lying serratus punch 10 x 3 10 lbs        Hook l

## 2018-12-17 ENCOUNTER — OFFICE VISIT (OUTPATIENT)
Dept: PHYSICAL THERAPY | Age: 63
End: 2018-12-17
Attending: FAMILY MEDICINE
Payer: COMMERCIAL

## 2018-12-17 PROCEDURE — 97110 THERAPEUTIC EXERCISES: CPT

## 2018-12-17 PROCEDURE — 97140 MANUAL THERAPY 1/> REGIONS: CPT

## 2018-12-17 NOTE — PROGRESS NOTES
Dx:  Left RC dysfunction with impingement             Authorized # of Visits:  12         Next MD visit: none scheduled  Fall Risk: standard         Precautions: n/a           Subjective: on and off discomfort over the weekend.   VAS 1/10  Objective:   Flex of motion 3 sets   Lateral steps YTB 10  X 2  Standing wall slide with YTB for serratus 10 x small range of motion 3 sets   Lateral steps YTB 10  X 2  Wall slides with RTB for resistance into scap retraction 10 x 3 sets   Lateral steps YTB 10  X 2 Wall sli demonstration and cueing to ensure proper form and safety.   Charges: TE x 2 MT x 1        Total Timed Treatment: 45 min  Total Treatment Time: 45 min

## 2018-12-20 ENCOUNTER — OFFICE VISIT (OUTPATIENT)
Dept: PHYSICAL THERAPY | Age: 63
End: 2018-12-20
Attending: FAMILY MEDICINE
Payer: COMMERCIAL

## 2018-12-20 PROCEDURE — 97110 THERAPEUTIC EXERCISES: CPT

## 2018-12-20 NOTE — PROGRESS NOTES
Dx:  Left RC dysfunction with impingement             Authorized # of Visits:  12         Next MD visit: none scheduled  Fall Risk: standard         Precautions: n/a           Subjective: felt great today.  Minimal pain VAS 1/10  Objective:   Flexion: R 175 x small range of motion 3 sets   Lateral steps YTB 10  X 2  Wall slides with RTB for resistance into scap retraction 10 x 3 sets   Lateral steps YTB 10  X 2 Wall slides with foam roller 15 x 2  Wall slides with foam roller 15 x 2      Hook lying serratus p skills to address impairments, with consideration of irritability and severity; specific exercises selected to improve impairments and improve motor control in order to optimize recovery; education, demonstration and cueing to ensure proper form and safety

## 2019-01-03 ENCOUNTER — OFFICE VISIT (OUTPATIENT)
Dept: PHYSICAL THERAPY | Age: 64
End: 2019-01-03
Attending: FAMILY MEDICINE
Payer: COMMERCIAL

## 2019-01-03 PROCEDURE — 97110 THERAPEUTIC EXERCISES: CPT

## 2019-01-03 NOTE — PROGRESS NOTES
Progress Summary    Pt has attended 7, cancelled 1, and no shown 0 visits in Physical Therapy. Subjective: states shoulder has been \"ok - I can't say better. \" On new years day, I did a lot of activities and then slept on my shoulder funky and I woke stabilization with ADLs such as lifting and reaching (12 visits) MET 1/3/2019        Plan: Pt to return to MD for further assessment.       Patient/Family/Caregiver was advised of these findings, precautions, and treatment options and has agreed to actively lying serratus punch 10 x 3 10 lbs  Hook lying serratus punch 10 x 3 10 lbs  Hook lying serratus punch 10 x 3 10 lbs  Hook lying serratus punch 10 x 3 10 lbs      Hook lying protracted shoulder with light random perturbations all directions 30 sec x 3  Shaheen consideration of irritability and severity; specific exercises selected to improve impairments and improve motor control in order to optimize recovery; education, demonstration and cueing to ensure proper form and safety.   Charges: TE x 3       Total Timed

## 2019-01-11 ENCOUNTER — APPOINTMENT (OUTPATIENT)
Dept: LAB | Age: 64
End: 2019-01-11
Attending: FAMILY MEDICINE
Payer: COMMERCIAL

## 2019-01-11 DIAGNOSIS — E55.9 VITAMIN D DEFICIENCY: ICD-10-CM

## 2019-01-11 LAB — VIT D+METAB SERPL-MCNC: 23.2 NG/ML (ref 30–100)

## 2019-01-11 PROCEDURE — 82306 VITAMIN D 25 HYDROXY: CPT | Performed by: FAMILY MEDICINE

## 2019-01-11 PROCEDURE — 36415 COLL VENOUS BLD VENIPUNCTURE: CPT | Performed by: FAMILY MEDICINE

## 2019-01-12 ENCOUNTER — OFFICE VISIT (OUTPATIENT)
Dept: FAMILY MEDICINE CLINIC | Facility: CLINIC | Age: 64
End: 2019-01-12
Payer: COMMERCIAL

## 2019-01-12 VITALS
TEMPERATURE: 99 F | WEIGHT: 188.63 LBS | OXYGEN SATURATION: 100 % | RESPIRATION RATE: 20 BRPM | HEART RATE: 89 BPM | BODY MASS INDEX: 29 KG/M2 | DIASTOLIC BLOOD PRESSURE: 84 MMHG | SYSTOLIC BLOOD PRESSURE: 132 MMHG

## 2019-01-12 DIAGNOSIS — J01.00 ACUTE NON-RECURRENT MAXILLARY SINUSITIS: Primary | ICD-10-CM

## 2019-01-12 DIAGNOSIS — H10.33 ACUTE BACTERIAL CONJUNCTIVITIS OF BOTH EYES: ICD-10-CM

## 2019-01-12 PROCEDURE — 99213 OFFICE O/P EST LOW 20 MIN: CPT | Performed by: NURSE PRACTITIONER

## 2019-01-12 RX ORDER — MOXIFLOXACIN 5 MG/ML
1 SOLUTION/ DROPS OPHTHALMIC 3 TIMES DAILY
Qty: 1 BOTTLE | Refills: 0 | Status: SHIPPED | OUTPATIENT
Start: 2019-01-12 | End: 2019-01-19

## 2019-01-12 RX ORDER — DOXYCYCLINE HYCLATE 100 MG
100 TABLET ORAL 2 TIMES DAILY
Qty: 14 TABLET | Refills: 0 | Status: SHIPPED | OUTPATIENT
Start: 2019-01-12 | End: 2019-01-19

## 2019-01-12 NOTE — PROGRESS NOTES
CHIEF COMPLAINT:   Patient presents with:  Sinus Problem: x 8-9 days  Conjunctivitis: x 2-3 days      HPI:   Chris Chris is a 59year old male who presents for cold symptoms for  9 days.  Symptoms have progressed into sinus congestion and been wor • HEMORRHOIDECTOMY N/A 10/27/2017    Performed by Francisco Stewart MD at [facility] MAIN OR   • OTHER      [place] teeth   • OTHER SURGICAL HISTORY  03/13/2009    Fiberoptic Examinations Gastroscopy      Family History   Problem Relation Age of Onset   • Arthritis Fa THROAT: oral mucosa pink, moist. No visible dental caries. Posterior pharynx is mildly erythematous. No exudates. No trismus. Uvula midline with no swelling. Voice clear/normal. No stridor.   NECK: supple, non-tender  LUNGS: clear to auscultation bilaterall What Is Conjunctivitis? Conjunctivitis is an irritation or infection. It affects the membrane that covers the white of your eye and the inside of your eyelid (conjunctiva). It can happen to one or both eyes.  The membrane swells and the blood vessels enl The sinuses are air-filled spaces within the bones of the face. They connect to the inside of the nose. Sinusitis is an inflammation of the tissue that lines the sinuses. Sinusitis can occur during a cold.  It can also happen due to allergies to pollens and · Do not use nasal rinses or irrigation during an acute sinus infection, unless your healthcare provider tells you to. Rinsing may spread the infection to other areas in your sinuses.   · Use acetaminophen or ibuprofen to control pain, unless another pain m

## 2019-01-12 NOTE — PATIENT INSTRUCTIONS
1. Rest. Drink plenty of fluids. 2. Doxycycline as prescribed. Moxifloxacin eye drops as prscribed. 3. Supportive care as discussed. 4. Nasal rinses as directed. Use humidifier at home when possible.   5. Follow up with PMD in 3-4 days for Meg Giang   [address], [place], [address] [place]. All rights reserved. This information is not intended as a substitute for professional medical care. Always follow your healthcare professional's instructions.         Sinusitis (Antibiotic Treatment)    The sinuses are air-filled s with high blood pressure should not use decongestants.  They can raise blood pressure.)  · Over-the-counter antihistamines may help if allergies contributed to your sinusitis.    · Do not use nasal rinses or irrigation during an acute sinus infection, unles

## 2019-01-14 DIAGNOSIS — E55.9 VITAMIN D DEFICIENCY: Primary | ICD-10-CM

## 2019-01-31 ENCOUNTER — OFFICE VISIT (OUTPATIENT)
Dept: FAMILY MEDICINE CLINIC | Facility: CLINIC | Age: 64
End: 2019-01-31
Payer: COMMERCIAL

## 2019-01-31 VITALS
HEIGHT: 68 IN | RESPIRATION RATE: 16 BRPM | SYSTOLIC BLOOD PRESSURE: 164 MMHG | OXYGEN SATURATION: 98 % | WEIGHT: 183 LBS | HEART RATE: 87 BPM | DIASTOLIC BLOOD PRESSURE: 96 MMHG | TEMPERATURE: 99 F | BODY MASS INDEX: 27.74 KG/M2

## 2019-01-31 DIAGNOSIS — R05.8 POST-VIRAL COUGH SYNDROME: Primary | ICD-10-CM

## 2019-01-31 PROCEDURE — 99213 OFFICE O/P EST LOW 20 MIN: CPT | Performed by: NURSE PRACTITIONER

## 2019-01-31 RX ORDER — BENZONATATE 200 MG/1
200 CAPSULE ORAL 3 TIMES DAILY PRN
Qty: 20 CAPSULE | Refills: 0 | Status: SHIPPED | OUTPATIENT
Start: 2019-01-31 | End: 2019-02-07

## 2019-01-31 NOTE — PROGRESS NOTES
Patient presents with:  Cough: chest congestion sx f/u from 01/12. HPI:   Angela Nunez is a 59year old male who presents for upper respiratory symptoms for  10  days. Patient reports being seen for similar symptoms on 01/12.  He completed Doxy High Cholesterol Mother    • Cancer Brother         tongue      Social History    Tobacco Use      Smoking status: Never Smoker      Smokeless tobacco: Never Used    Alcohol use:  Yes      Alcohol/week: 0.6 - 1.2 oz      Types: 1 - 2 Standard drinks or equi BP in both arms  Maintain a log book and present the readings to your pcp on your next visit  Low salt, low fat diet  Exercise regularly  Proceed to ED with a recorded BP of 240/120mmHg or if develops chest pain, shortness of breath, palpitations, diaphore

## 2019-04-24 PROBLEM — M75.42 IMPINGEMENT SYNDROME OF LEFT SHOULDER: Status: ACTIVE | Noted: 2019-04-24

## 2019-08-02 ENCOUNTER — LAB ENCOUNTER (OUTPATIENT)
Dept: LAB | Age: 64
End: 2019-08-02
Attending: FAMILY MEDICINE
Payer: COMMERCIAL

## 2019-08-02 ENCOUNTER — OFFICE VISIT (OUTPATIENT)
Dept: FAMILY MEDICINE CLINIC | Facility: CLINIC | Age: 64
End: 2019-08-02
Payer: COMMERCIAL

## 2019-08-02 ENCOUNTER — PATIENT MESSAGE (OUTPATIENT)
Dept: FAMILY MEDICINE CLINIC | Facility: CLINIC | Age: 64
End: 2019-08-02

## 2019-08-02 VITALS
HEART RATE: 68 BPM | RESPIRATION RATE: 20 BRPM | SYSTOLIC BLOOD PRESSURE: 110 MMHG | DIASTOLIC BLOOD PRESSURE: 80 MMHG | TEMPERATURE: 98 F | HEIGHT: 68 IN | WEIGHT: 180 LBS | BODY MASS INDEX: 27.28 KG/M2

## 2019-08-02 DIAGNOSIS — R73.9 HYPERGLYCEMIA: ICD-10-CM

## 2019-08-02 DIAGNOSIS — E55.9 VITAMIN D DEFICIENCY: ICD-10-CM

## 2019-08-02 DIAGNOSIS — R73.9 HYPERGLYCEMIA: Primary | ICD-10-CM

## 2019-08-02 DIAGNOSIS — Z00.00 BLOOD TESTS FOR ROUTINE GENERAL PHYSICAL EXAMINATION: ICD-10-CM

## 2019-08-02 DIAGNOSIS — Z13.89 SCREENING FOR GENITOURINARY CONDITION: ICD-10-CM

## 2019-08-02 DIAGNOSIS — E78.5 HYPERLIPIDEMIA, UNSPECIFIED HYPERLIPIDEMIA TYPE: Primary | ICD-10-CM

## 2019-08-02 DIAGNOSIS — Z00.00 ROUTINE GENERAL MEDICAL EXAMINATION AT A HEALTH CARE FACILITY: Primary | ICD-10-CM

## 2019-08-02 DIAGNOSIS — Z79.899 ON STATIN THERAPY: ICD-10-CM

## 2019-08-02 DIAGNOSIS — N52.9 ERECTILE DYSFUNCTION, UNSPECIFIED ERECTILE DYSFUNCTION TYPE: ICD-10-CM

## 2019-08-02 LAB
ALBUMIN SERPL-MCNC: 3.8 G/DL (ref 3.4–5)
ALBUMIN/GLOB SERPL: 1.1 {RATIO} (ref 1–2)
ALP LIVER SERPL-CCNC: 29 U/L (ref 45–117)
ALT SERPL-CCNC: 32 U/L (ref 16–61)
ANION GAP SERPL CALC-SCNC: 7 MMOL/L (ref 0–18)
AST SERPL-CCNC: 20 U/L (ref 15–37)
BASOPHILS # BLD AUTO: 0.02 X10(3) UL (ref 0–0.2)
BASOPHILS NFR BLD AUTO: 0.4 %
BILIRUB SERPL-MCNC: 1 MG/DL (ref 0.1–2)
BILIRUB UR QL STRIP.AUTO: NEGATIVE
BUN BLD-MCNC: 15 MG/DL (ref 7–18)
BUN/CREAT SERPL: 14.3 (ref 10–20)
CALCIUM BLD-MCNC: 8.5 MG/DL (ref 8.5–10.1)
CHLORIDE SERPL-SCNC: 105 MMOL/L (ref 98–112)
CHOLEST SMN-MCNC: 230 MG/DL (ref ?–200)
CLARITY UR REFRACT.AUTO: CLEAR
CO2 SERPL-SCNC: 29 MMOL/L (ref 21–32)
COLOR UR AUTO: YELLOW
COMPLEXED PSA SERPL-MCNC: 1.71 NG/ML (ref ?–4)
CREAT BLD-MCNC: 1.05 MG/DL (ref 0.7–1.3)
DEPRECATED RDW RBC AUTO: 43.4 FL (ref 35.1–46.3)
EOSINOPHIL # BLD AUTO: 0.23 X10(3) UL (ref 0–0.7)
EOSINOPHIL NFR BLD AUTO: 4.8 %
ERYTHROCYTE [DISTWIDTH] IN BLOOD BY AUTOMATED COUNT: 13.2 % (ref 11–15)
EST. AVERAGE GLUCOSE BLD GHB EST-MCNC: 128 MG/DL (ref 68–126)
GLOBULIN PLAS-MCNC: 3.4 G/DL (ref 2.8–4.4)
GLUCOSE BLD-MCNC: 102 MG/DL (ref 70–99)
GLUCOSE UR STRIP.AUTO-MCNC: NEGATIVE MG/DL
HBA1C MFR BLD HPLC: 6.1 % (ref ?–5.7)
HCT VFR BLD AUTO: 41.4 % (ref 39–53)
HDLC SERPL-MCNC: 49 MG/DL (ref 40–59)
HGB BLD-MCNC: 13.2 G/DL (ref 13–17.5)
IMM GRANULOCYTES # BLD AUTO: 0.01 X10(3) UL (ref 0–1)
IMM GRANULOCYTES NFR BLD: 0.2 %
KETONES UR STRIP.AUTO-MCNC: NEGATIVE MG/DL
LDLC SERPL CALC-MCNC: 159 MG/DL (ref ?–100)
LEUKOCYTE ESTERASE UR QL STRIP.AUTO: NEGATIVE
LYMPHOCYTES # BLD AUTO: 1.11 X10(3) UL (ref 1–4)
LYMPHOCYTES NFR BLD AUTO: 23.1 %
M PROTEIN MFR SERPL ELPH: 7.2 G/DL (ref 6.4–8.2)
MCH RBC QN AUTO: 28.6 PG (ref 26–34)
MCHC RBC AUTO-ENTMCNC: 31.9 G/DL (ref 31–37)
MCV RBC AUTO: 89.6 FL (ref 80–100)
MONOCYTES # BLD AUTO: 0.53 X10(3) UL (ref 0.1–1)
MONOCYTES NFR BLD AUTO: 11 %
NEUTROPHILS # BLD AUTO: 2.9 X10 (3) UL (ref 1.5–7.7)
NEUTROPHILS # BLD AUTO: 2.9 X10(3) UL (ref 1.5–7.7)
NEUTROPHILS NFR BLD AUTO: 60.5 %
NITRITE UR QL STRIP.AUTO: NEGATIVE
NONHDLC SERPL-MCNC: 181 MG/DL (ref ?–130)
OSMOLALITY SERPL CALC.SUM OF ELEC: 293 MOSM/KG (ref 275–295)
PH UR STRIP.AUTO: 6 [PH] (ref 4.5–8)
PLATELET # BLD AUTO: 191 10(3)UL (ref 150–450)
POTASSIUM SERPL-SCNC: 4.3 MMOL/L (ref 3.5–5.1)
PROT UR STRIP.AUTO-MCNC: NEGATIVE MG/DL
RBC # BLD AUTO: 4.62 X10(6)UL (ref 4.3–5.7)
RBC UR QL AUTO: NEGATIVE
SODIUM SERPL-SCNC: 141 MMOL/L (ref 136–145)
SP GR UR STRIP.AUTO: 1.02 (ref 1–1.03)
TESTOST SERPL-MCNC: 403.35 NG/DL
TRIGL SERPL-MCNC: 110 MG/DL (ref 30–149)
UROBILINOGEN UR STRIP.AUTO-MCNC: <2 MG/DL
VIT D+METAB SERPL-MCNC: 37.3 NG/ML (ref 30–100)
VLDLC SERPL CALC-MCNC: 22 MG/DL (ref 0–30)
WBC # BLD AUTO: 4.8 X10(3) UL (ref 4–11)

## 2019-08-02 PROCEDURE — 83036 HEMOGLOBIN GLYCOSYLATED A1C: CPT | Performed by: FAMILY MEDICINE

## 2019-08-02 PROCEDURE — 85025 COMPLETE CBC W/AUTO DIFF WBC: CPT | Performed by: FAMILY MEDICINE

## 2019-08-02 PROCEDURE — 99396 PREV VISIT EST AGE 40-64: CPT | Performed by: FAMILY MEDICINE

## 2019-08-02 PROCEDURE — 80053 COMPREHEN METABOLIC PANEL: CPT | Performed by: FAMILY MEDICINE

## 2019-08-02 PROCEDURE — 84403 ASSAY OF TOTAL TESTOSTERONE: CPT | Performed by: FAMILY MEDICINE

## 2019-08-02 PROCEDURE — 82306 VITAMIN D 25 HYDROXY: CPT | Performed by: FAMILY MEDICINE

## 2019-08-02 PROCEDURE — 36415 COLL VENOUS BLD VENIPUNCTURE: CPT | Performed by: FAMILY MEDICINE

## 2019-08-02 PROCEDURE — 80061 LIPID PANEL: CPT | Performed by: FAMILY MEDICINE

## 2019-08-02 PROCEDURE — 81003 URINALYSIS AUTO W/O SCOPE: CPT | Performed by: FAMILY MEDICINE

## 2019-08-02 PROCEDURE — 84153 ASSAY OF PSA TOTAL: CPT | Performed by: FAMILY MEDICINE

## 2019-08-02 RX ORDER — ATORVASTATIN CALCIUM 10 MG/1
10 TABLET, FILM COATED ORAL DAILY
Qty: 90 TABLET | Refills: 0 | Status: SHIPPED | OUTPATIENT
Start: 2019-08-02 | End: 2019-11-08 | Stop reason: ALTCHOICE

## 2019-08-02 RX ORDER — SILDENAFIL 50 MG/1
50 TABLET, FILM COATED ORAL
Qty: 10 TABLET | Refills: 5 | Status: SHIPPED | OUTPATIENT
Start: 2019-08-02 | End: 2019-11-08 | Stop reason: ALTCHOICE

## 2019-08-02 RX ORDER — LISINOPRIL 5 MG/1
5 TABLET ORAL DAILY
Qty: 90 TABLET | Refills: 1 | Status: SHIPPED | OUTPATIENT
Start: 2019-08-02 | End: 2020-02-17

## 2019-08-02 RX ORDER — LISINOPRIL 10 MG/1
TABLET ORAL
Qty: 90 TABLET | Refills: 1 | Status: CANCELLED | OUTPATIENT
Start: 2019-08-02

## 2019-08-02 NOTE — PATIENT INSTRUCTIONS
Check on insurance coverage for Shingrix. If covered, then ask your insurance if you should do it at the [address] [place] [address] office or pharmacy. We don't currently have it available in our office. Follow up in 6 months for follow up on blood pressure.

## 2019-08-02 NOTE — PROGRESS NOTES
CHIEF COMPLAINT   Melba Aragon is a 62year old male who presents for a complete physical exam.   HPI:   Here for physical.  Saw Dr. Joanne Swartz last year in December for prostate. Currently no vitamin D.     Complaining of off and on difficulty maintainin HISTORY  03/13/2009    Fiberoptic Examinations Gastroscopy      Family History   Problem Relation Age of Onset   • Arthritis Father    • High Blood Pressure Father    • Other (Other[other]) Father         spinal stenosis   • High Cholesterol Mother    • Ca grossly intact    ASSESSMENT AND PLAN:   Evi Murcia is a 62year old male who presents for a complete physical exam. Pt's weight is Body mass index is 27.37 kg/m². , recommended low fat diet and aerobic exercise 30 minutes three times weekly.   The

## 2019-08-26 ENCOUNTER — TELEPHONE (OUTPATIENT)
Dept: FAMILY MEDICINE CLINIC | Facility: CLINIC | Age: 64
End: 2019-08-26

## 2019-08-26 NOTE — TELEPHONE ENCOUNTER
I received his blood test results via fax. These were from what appears to be a work screening. He just had labs done with us in August also. LDL on the faxed labs is 164. Did he start his atorvastatin as recommended after we did his blood work here?

## 2019-08-27 NOTE — TELEPHONE ENCOUNTER
Call to pt's cell reaches identified voice mail. Left m req call back to triage nurse tomorrow for questions from Magalie Zavaleta. Provided ofc phone hours.

## 2019-08-29 NOTE — TELEPHONE ENCOUNTER
I only received his blood work. I did not receive a health screening form. He will need to send this to me if he actually has a health form that needs to be signed off on.

## 2019-09-03 ENCOUNTER — MED REC SCAN ONLY (OUTPATIENT)
Dept: FAMILY MEDICINE CLINIC | Facility: CLINIC | Age: 64
End: 2019-09-03

## 2019-12-23 ENCOUNTER — TELEPHONE (OUTPATIENT)
Dept: FAMILY MEDICINE CLINIC | Facility: CLINIC | Age: 64
End: 2019-12-23

## 2019-12-23 ENCOUNTER — OFFICE VISIT (OUTPATIENT)
Dept: FAMILY MEDICINE CLINIC | Facility: CLINIC | Age: 64
End: 2019-12-23
Payer: COMMERCIAL

## 2019-12-23 ENCOUNTER — HOSPITAL ENCOUNTER (OUTPATIENT)
Dept: GENERAL RADIOLOGY | Age: 64
Discharge: HOME | End: 2019-12-23
Attending: FAMILY MEDICINE
Payer: COMMERCIAL

## 2019-12-23 VITALS
BODY MASS INDEX: 27.58 KG/M2 | SYSTOLIC BLOOD PRESSURE: 136 MMHG | HEIGHT: 68 IN | HEART RATE: 86 BPM | TEMPERATURE: 99 F | RESPIRATION RATE: 18 BRPM | OXYGEN SATURATION: 97 % | DIASTOLIC BLOOD PRESSURE: 76 MMHG | WEIGHT: 182 LBS

## 2019-12-23 DIAGNOSIS — R05.9 COUGH: Primary | ICD-10-CM

## 2019-12-23 DIAGNOSIS — R05.9 COUGH: ICD-10-CM

## 2019-12-23 DIAGNOSIS — M25.512 LEFT SHOULDER PAIN, UNSPECIFIED CHRONICITY: ICD-10-CM

## 2019-12-23 DIAGNOSIS — J02.9 SORE THROAT: ICD-10-CM

## 2019-12-23 PROCEDURE — 71046 X-RAY EXAM CHEST 2 VIEWS: CPT | Performed by: FAMILY MEDICINE

## 2019-12-23 PROCEDURE — 99214 OFFICE O/P EST MOD 30 MIN: CPT | Performed by: FAMILY MEDICINE

## 2019-12-23 PROCEDURE — 87880 STREP A ASSAY W/OPTIC: CPT | Performed by: FAMILY MEDICINE

## 2019-12-23 RX ORDER — AZITHROMYCIN 250 MG/1
TABLET, FILM COATED ORAL
Qty: 6 TABLET | Refills: 0 | Status: SHIPPED | OUTPATIENT
Start: 2019-12-23 | End: 2020-09-01 | Stop reason: ALTCHOICE

## 2019-12-23 NOTE — TELEPHONE ENCOUNTER
Pt had OV with Dr. Becky Lira today and forgot to ask:  Should he continue with Coricidin and Tylenol because he was put on Z-hermelindo today.

## 2019-12-24 NOTE — PROGRESS NOTES
Samy Shabazz is a 62year old male. CC cough, sore throat, shoulder pain  HPI:   Patient is coming to the office not feeling well for the last 2 weeks initially he was thinking that he has allergies. His nasal congestion stuffiness postnasal drip. exertion cough, chest congestion  CARDIOVASCULAR: denies chest pain on exertion  GI: denies abdominal pain and denies heartburn  NEURO: denies headaches  Musculoskeletal left shoulder pain plan surgery this Friday.   Psych normal.    EXAM:   /76 (BP L plenty of fluids. Take Tylenol or ibuprofen as needed for fever or for pain. Nasal spray saline over-the-counter as needed. Lozenges over-the-counter as needed. Asmanex 1 puff once a day for 7 to 10 days.   Imaging & Consults:  None   Chest x-ray revi

## 2019-12-27 ENCOUNTER — TELEPHONE (OUTPATIENT)
Dept: FAMILY MEDICINE CLINIC | Facility: CLINIC | Age: 64
End: 2019-12-27

## 2019-12-27 RX ORDER — BENZONATATE 100 MG/1
100 CAPSULE ORAL 3 TIMES DAILY PRN
Qty: 21 CAPSULE | Refills: 0 | Status: SHIPPED | OUTPATIENT
Start: 2019-12-27 | End: 2020-09-01 | Stop reason: ALTCHOICE

## 2019-12-27 NOTE — TELEPHONE ENCOUNTER
Still coughing, but not worse. Is a lot better. Took antibiotic and using inhaler. No new symptoms. Is there a cough suppressant he could have at this point?

## 2019-12-27 NOTE — TELEPHONE ENCOUNTER
Pt was seen 12/23/19 for cough. He finished abx just picked up inhaler yesterdy. He still has cough and is wondering what to do. Please advise. Thank you.

## 2020-01-01 ENCOUNTER — EXTERNAL RECORD (OUTPATIENT)
Dept: OTHER | Age: 65
End: 2020-01-01

## 2020-01-01 ENCOUNTER — EXTERNAL RECORD (OUTPATIENT)
Dept: CARDIOLOGY | Age: 65
End: 2020-01-01

## 2020-02-03 PROBLEM — Z47.89 ORTHOPEDIC AFTERCARE: Status: ACTIVE | Noted: 2020-02-03

## 2020-02-06 ENCOUNTER — OFFICE VISIT (OUTPATIENT)
Dept: PHYSICAL THERAPY | Age: 65
End: 2020-02-06
Attending: ORTHOPAEDIC SURGERY
Payer: COMMERCIAL

## 2020-02-06 DIAGNOSIS — Z47.89 ORTHOPEDIC AFTERCARE: ICD-10-CM

## 2020-02-06 PROCEDURE — 97110 THERAPEUTIC EXERCISES: CPT

## 2020-02-06 PROCEDURE — 97162 PT EVAL MOD COMPLEX 30 MIN: CPT

## 2020-02-06 NOTE — PROGRESS NOTES
POST-OP SHOULDER EVALUATION:   Referring Physician: Dr. Dmitriy Hicks  Diagnosis: Orthopedic aftercare (A45.55)    S/p Arthroscopic acromioplasty left shoulder; Arthroscopic distal clavicle resection left shoulder;  Arthroscopic debridement partial thickness function. Functional deficits include but are not limited to difficulty with reaching OH, out to side, behind back, unable to lift >5#. Shanta Hernandez Signs and symptoms are consistent with MD diagnosis of post surgical arthroscopy.  Pt and PT discussed evaluation find and evolving symptoms including changing pain levels.   PLAN OF CARE:    Goals: (To be met in 10 visits)   · Pt will report improved ability to sleep without waking due to shoulder pain  · Pt will improve shoulder flexion AROM to >/=160 degrees to be able t

## 2020-02-11 ENCOUNTER — OFFICE VISIT (OUTPATIENT)
Dept: PHYSICAL THERAPY | Age: 65
End: 2020-02-11
Attending: ORTHOPAEDIC SURGERY
Payer: COMMERCIAL

## 2020-02-11 PROCEDURE — 97140 MANUAL THERAPY 1/> REGIONS: CPT

## 2020-02-11 PROCEDURE — 97110 THERAPEUTIC EXERCISES: CPT

## 2020-02-11 NOTE — PROGRESS NOTES
Dx: Orthopedic aftercare (J36.89)    S/p Arthroscopic acromioplasty left shoulder; Arthroscopic distal clavicle resection left shoulder;  Arthroscopic debridement partial thickness rotator cuff tear left shoulder         Insurance (Authorized # of Visits): pain  · Pt will improve shoulder strength throughout to 5/5 to improve function with ADLs including reaching OH cabinets/shelves to put away/lower household items  · Pt will be independent and compliant with comprehensive HEP to maintain progress achieved

## 2020-02-13 ENCOUNTER — OFFICE VISIT (OUTPATIENT)
Dept: PHYSICAL THERAPY | Age: 65
End: 2020-02-13
Attending: ORTHOPAEDIC SURGERY
Payer: COMMERCIAL

## 2020-02-13 PROCEDURE — 97140 MANUAL THERAPY 1/> REGIONS: CPT

## 2020-02-13 PROCEDURE — 97110 THERAPEUTIC EXERCISES: CPT

## 2020-02-13 NOTE — PROGRESS NOTES
Dx: Orthopedic aftercare (Z70.29)    S/p Arthroscopic acromioplasty left shoulder; Arthroscopic distal clavicle resection left shoulder;  Arthroscopic debridement partial thickness rotator cuff tear left shoulder         Insurance (Authorized # of Visits): strengthening. Try prone one bed rows against gravity next session. Date: 2/11/2020  TX#: 2/8 Date: 2/13/2020             TX#: 3/8 Date:                 TX#: 4/ Date:                 TX#: 5/ Date:    Tx#: 6/   Warm up: forward UBE no resistance 4 min

## 2020-02-17 ENCOUNTER — OFFICE VISIT (OUTPATIENT)
Dept: PHYSICAL THERAPY | Age: 65
End: 2020-02-17
Attending: ORTHOPAEDIC SURGERY
Payer: COMMERCIAL

## 2020-02-17 PROCEDURE — 97140 MANUAL THERAPY 1/> REGIONS: CPT

## 2020-02-17 PROCEDURE — 97110 THERAPEUTIC EXERCISES: CPT

## 2020-02-17 RX ORDER — LISINOPRIL 5 MG/1
5 TABLET ORAL DAILY
Qty: 90 TABLET | Refills: 0 | Status: SHIPPED | OUTPATIENT
Start: 2020-02-17 | End: 2020-05-28

## 2020-02-17 NOTE — TELEPHONE ENCOUNTER
Spoke with Patient states he will make it online. \"I don't understand the reason for this appointment. I have been on it for years. \"  I explained procedure. Patient is almost out of medication.

## 2020-02-17 NOTE — PROGRESS NOTES
Dx: Orthopedic aftercare (F88.48)    S/p Arthroscopic acromioplasty left shoulder; Arthroscopic distal clavicle resection left shoulder;  Arthroscopic debridement partial thickness rotator cuff tear left shoulder         Insurance (Authorized # of Visits): steering wheel without pain  · Pt will improve shoulder strength throughout to 5/5 to improve function with ADLs including reaching OH cabinets/shelves to put away/lower household items  · Pt will be independent and compliant with comprehensive HEP to main IR/ER in neutral  YTB x10 ea There ex:   Doorway pectoral stretch 3x10 sec  Wall slides with towel flexion x 10 reps   Single arm scaption wall slide 10 reps   Standing bilat shoulder AROM (thumb up) flexion and scaption x10 ea to comfort  Scapular retract

## 2020-02-18 RX ORDER — LISINOPRIL 5 MG/1
TABLET ORAL
Qty: 90 TABLET | Refills: 0 | OUTPATIENT
Start: 2020-02-18

## 2020-02-18 NOTE — TELEPHONE ENCOUNTER
I believe this is a duplicate.   Patient was in meeting states he will schedule an appointment through [address] [place] [address] [id number]

## 2020-02-18 NOTE — TELEPHONE ENCOUNTER
Please call for appt for follow up on HTN (6 months since last visit) then route back to me to fill. Thanks.

## 2020-02-20 ENCOUNTER — OFFICE VISIT (OUTPATIENT)
Dept: PHYSICAL THERAPY | Age: 65
End: 2020-02-20
Attending: ORTHOPAEDIC SURGERY
Payer: COMMERCIAL

## 2020-02-20 PROCEDURE — 97140 MANUAL THERAPY 1/> REGIONS: CPT

## 2020-02-20 PROCEDURE — 97110 THERAPEUTIC EXERCISES: CPT

## 2020-02-20 NOTE — PROGRESS NOTES
Dx: Orthopedic aftercare (X85.44)    S/p Arthroscopic acromioplasty left shoulder; Arthroscopic distal clavicle resection left shoulder;  Arthroscopic debridement partial thickness rotator cuff tear left shoulder         Insurance (Authorized # of Visits): without pain  · Pt will improve shoulder strength throughout to 5/5 to improve function with ADLs including reaching OH cabinets/shelves to put away/lower household items  · Pt will be independent and compliant with comprehensive HEP to maintain progress a hold x 2 reps  There Ex:  Doorway pectoral stretch 3x10 sec  OH pulleys: flex, abd, IR HBB x10 ea  Wall slides with towel flexion x10  Standing bilat shoulder AROM (thumb up) flexion and scaption x10 ea  bilat scapular rows and sh ext RTB x15 ea  L sh IR/E

## 2020-02-24 ENCOUNTER — OFFICE VISIT (OUTPATIENT)
Dept: PHYSICAL THERAPY | Age: 65
End: 2020-02-24
Attending: ORTHOPAEDIC SURGERY
Payer: COMMERCIAL

## 2020-02-24 PROCEDURE — 97140 MANUAL THERAPY 1/> REGIONS: CPT

## 2020-02-24 PROCEDURE — 97110 THERAPEUTIC EXERCISES: CPT

## 2020-02-24 NOTE — PROGRESS NOTES
Dx: Orthopedic aftercare (C89.58)    S/p Arthroscopic acromioplasty left shoulder; Arthroscopic distal clavicle resection left shoulder;  Arthroscopic debridement partial thickness rotator cuff tear left shoulder         Insurance (Authorized # of Visits): HEP to maintain progress achieved in PT    Plan: Continue per plan of care. Progress ROM, and shoulder/scapualr strengthening.      Date: 2/11/2020  TX#: 2/8 Date: 2/13/2020             TX#: 3/8 Date:  2/17/2020            TX#: 4/8 Date: 2/20/2020 single arm 10 reps - no pain  Corner chest stretch- VCs for hand position to remove pain 30 sec hold x 2 reps  There Ex:  Doorway pectoral stretch 3x10 sec  OH pulleys: flex, abd, IR HBB x10 ea  Wall slides with towel flexion x10  Standing bilat shoulder A standing wand exercises   2/17/2020 yellow band ER with towel abduction  2/20/2020  RTB for shoulder IR/ER, GTB scapular rows and self thoracic ext on FR  Charges: there  Ex: 2; manual: 1        Total Timed Treatment: 45 min  Total Treatment Time: 55 min

## 2020-02-26 ENCOUNTER — OFFICE VISIT (OUTPATIENT)
Dept: PHYSICAL THERAPY | Age: 65
End: 2020-02-26
Attending: ORTHOPAEDIC SURGERY
Payer: COMMERCIAL

## 2020-02-26 PROCEDURE — 97140 MANUAL THERAPY 1/> REGIONS: CPT

## 2020-02-26 PROCEDURE — 97110 THERAPEUTIC EXERCISES: CPT

## 2020-02-26 NOTE — PROGRESS NOTES
Dx: Orthopedic aftercare (R26.31)    S/p Arthroscopic acromioplasty left shoulder; Arthroscopic distal clavicle resection left shoulder;  Arthroscopic debridement partial thickness rotator cuff tear left shoulder         Insurance (Authorized # of Visits): achieved in PT    Plan: Continue per plan of care. Progress ROM, and shoulder/scapualr strengthening.      Date: 2/11/2020  TX#: 2/8 Date: 2/13/2020             TX#: 3/8 Date:  2/17/2020            TX#: 4/8 Date: 2/20/2020             TX#: 5/8 Date: 2/24/20 position 10 reps  Wand press 10 reps   Wand ER with towel flexion and slight abduction - VCs for hand position 10 reps   Wand standing flexion and abduction- 5 reps each-  adjusted mechanics *pain- deferred   Wall slide double arm 10 reps; single arm 10 re upward scapular rotation 10 reps   Scapular retraction- GTB x20  Doorway pectoral stretch 3x10 sec  L sh IR/ER in neutral  RTB 2x10 ea   Ice provided supine with bolster under knees to the L shoulder 10 min  CP x 10 min  Pt education: cold pack timing and

## 2020-03-02 ENCOUNTER — APPOINTMENT (OUTPATIENT)
Dept: PHYSICAL THERAPY | Age: 65
End: 2020-03-02
Attending: ORTHOPAEDIC SURGERY
Payer: COMMERCIAL

## 2020-03-05 ENCOUNTER — OFFICE VISIT (OUTPATIENT)
Dept: PHYSICAL THERAPY | Age: 65
End: 2020-03-05
Attending: ORTHOPAEDIC SURGERY
Payer: COMMERCIAL

## 2020-03-05 PROCEDURE — 97110 THERAPEUTIC EXERCISES: CPT

## 2020-03-05 PROCEDURE — 97140 MANUAL THERAPY 1/> REGIONS: CPT

## 2020-03-05 NOTE — PROGRESS NOTES
Dx: Orthopedic aftercare (E92.25)    S/p Arthroscopic acromioplasty left shoulder; Arthroscopic distal clavicle resection left shoulder;  Arthroscopic debridement partial thickness rotator cuff tear left shoulder         Insurance (Authorized # of Visits): increase shoulder AROM ER to 75 to reach and fasten seatbelt   · Pt will increase shoulder AROM IR to T7 to be able to reach in back pocket, tuck in shirt, and turn steering wheel without pain  · Pt will improve shoulder strength throughout to 5/5 to impro min  AA/PROM to comfort 5' all directions Manual:   Posterior GH joint glide gr III on the L 10 reps x 2 sets  Inferior GH joint glide gr III on the L 10 reps x 2 sets   GHJ gentle distraction mobilization  ACJ inferior glides Gr lll  IASTM left upper trap 2# scapular row x10, prone shoulder ext x10  Standing bilat shoulder AROM (thumb up) flexion and scaption x10 ea to comfort  -With manual upward scapular rotation 10 reps   Scapular retraction- GTB x20  Doorway pectoral stretch 3x10 sec  L sh IR/ER in neut

## 2020-03-09 ENCOUNTER — OFFICE VISIT (OUTPATIENT)
Dept: PHYSICAL THERAPY | Age: 65
End: 2020-03-09
Attending: ORTHOPAEDIC SURGERY
Payer: COMMERCIAL

## 2020-03-09 PROCEDURE — 97110 THERAPEUTIC EXERCISES: CPT

## 2020-03-09 PROCEDURE — 97140 MANUAL THERAPY 1/> REGIONS: CPT

## 2020-03-09 NOTE — PROGRESS NOTES
Dx: Orthopedic aftercare (Q33.57)    S/p Arthroscopic acromioplasty left shoulder; Arthroscopic distal clavicle resection left shoulder;  Arthroscopic debridement partial thickness rotator cuff tear left shoulder         Insurance (Authorized # of Visits): back pocket, tuck in shirt, and turn steering wheel without pain  · Pt will improve shoulder strength throughout to 5/5 to improve function with ADLs including reaching OH cabinets/shelves to put away/lower household items  · Pt will be independent and com 4 min  AA/PROM to comfort 10' all directions Manual:   Posterior GH joint glide gr III on the L 10 reps x 2 sets  Inferior GH joint glide gr III on the L 10 reps x 2 sets   Prone P-A skin lock  Aimed at C-t junction gr III-IV 5'   PROM shoulder flexion wit sh IR/ER in neutral  RTB 2x10 ea There ex:   Prone: 2# scapular row x10, prone shoulder ext x10  Red TB shoulder ER towel abduction 10 reps x 2 sets   Review of HEP  Reassessment 8 min  There ex:   Red TB shoulder ER towel abduction 10 reps x 2 sets   Red

## 2020-03-11 ENCOUNTER — OFFICE VISIT (OUTPATIENT)
Dept: PHYSICAL THERAPY | Age: 65
End: 2020-03-11
Attending: ORTHOPAEDIC SURGERY
Payer: COMMERCIAL

## 2020-03-11 PROCEDURE — 97110 THERAPEUTIC EXERCISES: CPT

## 2020-03-11 PROCEDURE — 97140 MANUAL THERAPY 1/> REGIONS: CPT

## 2020-03-11 NOTE — PROGRESS NOTES
Dx: Orthopedic aftercare (H16.66)    S/p Arthroscopic acromioplasty left shoulder; Arthroscopic distal clavicle resection left shoulder;  Arthroscopic debridement partial thickness rotator cuff tear left shoulder         Insurance (Authorized # of Visits): be independent and compliant with comprehensive HEP to maintain progress achieved in PT    Plan: Continue skilled Physical Therapy  Tapering to 1x/week.      Date:  2/17/2020            TX#: 4/8 Date: 2/20/2020             TX#: 5/8 Date: 2/24/2020  Tx#: 6/8 joint glide gr III on the L 10 reps x 2 sets  Inferior GH joint glide gr III on the L 10 reps x 2 sets   PROM shoulder flexion, ER and abduction with distraction 10 sec hold 10 reps x 2 sets Manual:  Posterior GH joint glide gr III on the L 10 reps x 3 set neutral  RTB 2x10 ea There ex:   Prone: 2# scapular row x10, prone shoulder ext x10  Red TB shoulder ER towel abduction 10 reps x 2 sets   Review of HEP  Reassessment 8 min  There ex:   Red TB shoulder ER towel abduction 10 reps x 2 sets   Red TB shoulder

## 2020-03-16 ENCOUNTER — TELEPHONE (OUTPATIENT)
Dept: PHYSICAL THERAPY | Age: 65
End: 2020-03-16

## 2020-03-16 ENCOUNTER — APPOINTMENT (OUTPATIENT)
Dept: PHYSICAL THERAPY | Age: 65
End: 2020-03-16
Attending: ORTHOPAEDIC SURGERY
Payer: COMMERCIAL

## 2020-03-16 NOTE — TELEPHONE ENCOUNTER
Contacted pt to discuss department's initiative to protect at-risk patients from COVID-19 exposure. Discussed recommendations relative to pt's age, diagnosis, progress thus far.  Explained that future appts are to be determined on a week by week basis and t

## 2020-03-18 ENCOUNTER — APPOINTMENT (OUTPATIENT)
Dept: PHYSICAL THERAPY | Age: 65
End: 2020-03-18
Attending: ORTHOPAEDIC SURGERY
Payer: COMMERCIAL

## 2020-04-06 ENCOUNTER — TELEPHONE (OUTPATIENT)
Dept: PHYSICAL THERAPY | Age: 65
End: 2020-04-06

## 2020-05-28 RX ORDER — LISINOPRIL 5 MG/1
5 TABLET ORAL DAILY
Qty: 90 TABLET | Refills: 0 | Status: SHIPPED | OUTPATIENT
Start: 2020-05-28 | End: 2020-09-01

## 2020-08-26 ENCOUNTER — LAB ENCOUNTER (OUTPATIENT)
Dept: LAB | Age: 65
End: 2020-08-26
Attending: FAMILY MEDICINE
Payer: COMMERCIAL

## 2020-08-26 DIAGNOSIS — Z12.5 SCREENING FOR PROSTATE CANCER: ICD-10-CM

## 2020-08-26 DIAGNOSIS — Z00.00 ROUTINE GENERAL MEDICAL EXAMINATION AT A HEALTH CARE FACILITY: ICD-10-CM

## 2020-08-26 DIAGNOSIS — Z13.89 SCREENING FOR GENITOURINARY CONDITION: ICD-10-CM

## 2020-08-26 LAB
ALBUMIN SERPL-MCNC: 3.6 G/DL (ref 3.4–5)
ALBUMIN/GLOB SERPL: 1.1 {RATIO} (ref 1–2)
ALP LIVER SERPL-CCNC: 26 U/L (ref 45–117)
ALT SERPL-CCNC: 24 U/L (ref 16–61)
ANION GAP SERPL CALC-SCNC: 6 MMOL/L (ref 0–18)
AST SERPL-CCNC: 16 U/L (ref 15–37)
BASOPHILS # BLD AUTO: 0.04 X10(3) UL (ref 0–0.2)
BASOPHILS NFR BLD AUTO: 0.9 %
BILIRUB SERPL-MCNC: 0.5 MG/DL (ref 0.1–2)
BILIRUB UR QL STRIP.AUTO: NEGATIVE
BUN BLD-MCNC: 15 MG/DL (ref 7–18)
BUN/CREAT SERPL: 16.9 (ref 10–20)
CALCIUM BLD-MCNC: 8.9 MG/DL (ref 8.5–10.1)
CHLORIDE SERPL-SCNC: 109 MMOL/L (ref 98–112)
CHOLEST SMN-MCNC: 200 MG/DL (ref ?–200)
CLARITY UR REFRACT.AUTO: CLEAR
CO2 SERPL-SCNC: 24 MMOL/L (ref 21–32)
COLOR UR AUTO: YELLOW
COMPLEXED PSA SERPL-MCNC: 1.43 NG/ML (ref ?–4)
CREAT BLD-MCNC: 0.89 MG/DL (ref 0.7–1.3)
DEPRECATED RDW RBC AUTO: 42.8 FL (ref 35.1–46.3)
EOSINOPHIL # BLD AUTO: 0.19 X10(3) UL (ref 0–0.7)
EOSINOPHIL NFR BLD AUTO: 4.1 %
ERYTHROCYTE [DISTWIDTH] IN BLOOD BY AUTOMATED COUNT: 13 % (ref 11–15)
GLOBULIN PLAS-MCNC: 3.2 G/DL (ref 2.8–4.4)
GLUCOSE BLD-MCNC: 101 MG/DL (ref 70–99)
GLUCOSE UR STRIP.AUTO-MCNC: NEGATIVE MG/DL
HCT VFR BLD AUTO: 40.4 % (ref 39–53)
HDLC SERPL-MCNC: 50 MG/DL (ref 40–59)
HGB BLD-MCNC: 13.2 G/DL (ref 13–17.5)
IMM GRANULOCYTES # BLD AUTO: 0.01 X10(3) UL (ref 0–1)
IMM GRANULOCYTES NFR BLD: 0.2 %
KETONES UR STRIP.AUTO-MCNC: NEGATIVE MG/DL
LDLC SERPL CALC-MCNC: 123 MG/DL (ref ?–100)
LEUKOCYTE ESTERASE UR QL STRIP.AUTO: NEGATIVE
LYMPHOCYTES # BLD AUTO: 1.15 X10(3) UL (ref 1–4)
LYMPHOCYTES NFR BLD AUTO: 25.1 %
M PROTEIN MFR SERPL ELPH: 6.8 G/DL (ref 6.4–8.2)
MCH RBC QN AUTO: 29.6 PG (ref 26–34)
MCHC RBC AUTO-ENTMCNC: 32.7 G/DL (ref 31–37)
MCV RBC AUTO: 90.6 FL (ref 80–100)
MONOCYTES # BLD AUTO: 0.47 X10(3) UL (ref 0.1–1)
MONOCYTES NFR BLD AUTO: 10.3 %
NEUTROPHILS # BLD AUTO: 2.72 X10 (3) UL (ref 1.5–7.7)
NEUTROPHILS # BLD AUTO: 2.72 X10(3) UL (ref 1.5–7.7)
NEUTROPHILS NFR BLD AUTO: 59.4 %
NITRITE UR QL STRIP.AUTO: NEGATIVE
NONHDLC SERPL-MCNC: 150 MG/DL (ref ?–130)
OSMOLALITY SERPL CALC.SUM OF ELEC: 289 MOSM/KG (ref 275–295)
PATIENT FASTING Y/N/NP: YES
PATIENT FASTING Y/N/NP: YES
PH UR STRIP.AUTO: 5 [PH] (ref 4.5–8)
PLATELET # BLD AUTO: 181 10(3)UL (ref 150–450)
POTASSIUM SERPL-SCNC: 4 MMOL/L (ref 3.5–5.1)
PROT UR STRIP.AUTO-MCNC: NEGATIVE MG/DL
RBC # BLD AUTO: 4.46 X10(6)UL (ref 4.3–5.7)
RBC UR QL AUTO: NEGATIVE
SODIUM SERPL-SCNC: 139 MMOL/L (ref 136–145)
SP GR UR STRIP.AUTO: 1.02 (ref 1–1.03)
TRIGL SERPL-MCNC: 136 MG/DL (ref 30–149)
TSI SER-ACNC: 1.89 MIU/ML (ref 0.36–3.74)
UROBILINOGEN UR STRIP.AUTO-MCNC: <2 MG/DL
VLDLC SERPL CALC-MCNC: 27 MG/DL (ref 0–30)
WBC # BLD AUTO: 4.6 X10(3) UL (ref 4–11)

## 2020-08-26 PROCEDURE — 80050 GENERAL HEALTH PANEL: CPT | Performed by: FAMILY MEDICINE

## 2020-08-26 PROCEDURE — 81003 URINALYSIS AUTO W/O SCOPE: CPT | Performed by: FAMILY MEDICINE

## 2020-08-26 PROCEDURE — 84153 ASSAY OF PSA TOTAL: CPT | Performed by: FAMILY MEDICINE

## 2020-08-26 PROCEDURE — 80061 LIPID PANEL: CPT | Performed by: FAMILY MEDICINE

## 2020-08-26 PROCEDURE — 36415 COLL VENOUS BLD VENIPUNCTURE: CPT | Performed by: FAMILY MEDICINE

## 2020-09-01 ENCOUNTER — OFFICE VISIT (OUTPATIENT)
Dept: FAMILY MEDICINE CLINIC | Facility: CLINIC | Age: 65
End: 2020-09-01
Payer: COMMERCIAL

## 2020-09-01 VITALS
SYSTOLIC BLOOD PRESSURE: 128 MMHG | DIASTOLIC BLOOD PRESSURE: 70 MMHG | RESPIRATION RATE: 12 BRPM | HEART RATE: 68 BPM | BODY MASS INDEX: 27.43 KG/M2 | WEIGHT: 181 LBS | TEMPERATURE: 98 F | HEIGHT: 68 IN

## 2020-09-01 DIAGNOSIS — R25.2 LEG CRAMPS: ICD-10-CM

## 2020-09-01 DIAGNOSIS — Z00.00 ROUTINE GENERAL MEDICAL EXAMINATION AT A HEALTH CARE FACILITY: Primary | ICD-10-CM

## 2020-09-01 PROBLEM — Z47.89 ORTHOPEDIC AFTERCARE: Status: RESOLVED | Noted: 2020-02-03 | Resolved: 2020-09-01

## 2020-09-01 PROCEDURE — 3074F SYST BP LT 130 MM HG: CPT | Performed by: FAMILY MEDICINE

## 2020-09-01 PROCEDURE — 99396 PREV VISIT EST AGE 40-64: CPT | Performed by: FAMILY MEDICINE

## 2020-09-01 PROCEDURE — 3078F DIAST BP <80 MM HG: CPT | Performed by: FAMILY MEDICINE

## 2020-09-01 PROCEDURE — 3008F BODY MASS INDEX DOCD: CPT | Performed by: FAMILY MEDICINE

## 2020-09-01 RX ORDER — LISINOPRIL 5 MG/1
5 TABLET ORAL DAILY
Qty: 90 TABLET | Refills: 1 | Status: SHIPPED | OUTPATIENT
Start: 2020-09-01 | End: 2020-09-08

## 2020-09-01 RX ORDER — SILDENAFIL 50 MG/1
50 TABLET, FILM COATED ORAL
Qty: 10 TABLET | Refills: 5 | Status: SHIPPED | OUTPATIENT
Start: 2020-09-01 | End: 2020-10-29

## 2020-09-01 NOTE — PATIENT INSTRUCTIONS
Check on insurance coverage for Shingrix. If covered, then ask your insurance if you should do it at the [address] [place] [address] office or pharmacy. Call for nurse visit if covered here. Consider heart scan.

## 2020-09-01 NOTE — PROGRESS NOTES
CHIEF COMPLAINT   Tereza Gray is a 60year old male who presents for a complete physical exam.   HPI:   Here for physical.  Getting some cramps in upper legs at times - biking more recently. Complaining of difficulty maintaining erections.   Would l 03/13/2009   • HEMORRHOIDECTOMY  10/2017   • HEMORRHOIDECTOMY N/A 10/27/2017    Performed by Cosmo Swann MD at [facility] MAIN OR   • OTHER      [place] teeth   • OTHER SURGICAL HISTORY  03/13/2009    Fiberoptic Examinations Gastroscopy      Family History   Pro deformity  EXTREMITIES: no cyanosis, clubbing or edema  NEURO: Oriented times three,cranial nerves are grossly intact,motor and sensory are grossly intact    ASSESSMENT AND PLAN:   Katie aCrrasco is a 60year old male who presents for a complete physi

## 2020-09-03 ENCOUNTER — TELEPHONE (OUTPATIENT)
Dept: FAMILY MEDICINE CLINIC | Facility: CLINIC | Age: 65
End: 2020-09-03

## 2020-09-03 NOTE — TELEPHONE ENCOUNTER
Patient send results from [facility] screening. He has thyroid nodules, mild carotid artery plaque, mild mitral regurgitation on heart ultrasound.   Please have the patient schedule either an in person or telephone visit in the next 4-6 weeks to discuss t

## 2020-09-09 RX ORDER — LISINOPRIL 5 MG/1
5 TABLET ORAL DAILY
Qty: 90 TABLET | Refills: 1 | Status: SHIPPED | OUTPATIENT
Start: 2020-09-09 | End: 2021-09-12

## 2020-09-09 NOTE — TELEPHONE ENCOUNTER
Pt called back and he wanted to know why he needed another appt. Pt informed of Octavia Nevins Dressler's recommendation to discuss multiple issues and recommendations. Pt voiced understanding and states he will the schedule via [employer].

## 2020-09-15 ENCOUNTER — TELEMEDICINE (OUTPATIENT)
Dept: FAMILY MEDICINE CLINIC | Facility: CLINIC | Age: 65
End: 2020-09-15
Payer: COMMERCIAL

## 2020-09-15 DIAGNOSIS — I65.23 ATHEROSCLEROSIS OF BOTH CAROTID ARTERIES: ICD-10-CM

## 2020-09-15 DIAGNOSIS — E04.2 MULTIPLE THYROID NODULES: Primary | ICD-10-CM

## 2020-09-15 DIAGNOSIS — I34.0 MITRAL VALVE INSUFFICIENCY, UNSPECIFIED ETIOLOGY: ICD-10-CM

## 2020-09-15 PROCEDURE — 99214 OFFICE O/P EST MOD 30 MIN: CPT | Performed by: FAMILY MEDICINE

## 2020-09-15 NOTE — PROGRESS NOTES
Candelaria Valle is a 60year old male. CHIEF COMPLAINT   Follow up on screening tests  HPI:   This visit is conducted using Telemedicine with live, interactive video and audio.  Patient understands and accepts financial responsibility for any deductibl arteries  Mitral valve insufficiency, unspecified etiology      Imaging & Consults:  US THYROID (BJY=11829)  CARD PV CAROTID DOPPLER BILAT - DIAG IMG (CPT=93880)    Discussed starting a statin for small amount of plaque in carotids - patient would like to

## 2020-09-15 NOTE — PATIENT INSTRUCTIONS
Controlling Your Cholesterol  Cholesterol is a waxy substance. It travels in your blood through the blood vessels. When you have high cholesterol, it can build up along the walls of the blood vessels.  This makes the vessels narrower and decreases blood f · Eat about two, 3.5 ounce servings of non-fried fish such as salmon, herring, sardines or mackerel per week . Most fish contain omega-3 fatty acids. These help lower total blood cholesterol.  Omega-3 fatty acids lowers triglyceride levels, another form of © 9947-5793 [employer] [address]. [address] [facility] [place]. All rights reserved. This information is not intended as a substitute for professional medical care. Always follow your healthcare professional's instructions.

## 2020-09-18 ENCOUNTER — MED REC SCAN ONLY (OUTPATIENT)
Dept: FAMILY MEDICINE CLINIC | Facility: CLINIC | Age: 65
End: 2020-09-18

## 2020-09-21 ENCOUNTER — LAB ENCOUNTER (OUTPATIENT)
Dept: LAB | Age: 65
End: 2020-09-21
Attending: FAMILY MEDICINE
Payer: COMMERCIAL

## 2020-09-21 ENCOUNTER — ORDER TRANSCRIPTION (OUTPATIENT)
Dept: ADMINISTRATIVE | Facility: HOSPITAL | Age: 65
End: 2020-09-21

## 2020-09-21 DIAGNOSIS — Z13.9 ENCOUNTER FOR SCREENING: Primary | ICD-10-CM

## 2020-09-21 DIAGNOSIS — R25.2 LEG CRAMPS: ICD-10-CM

## 2020-09-21 LAB — HAV IGM SER QL: 2.1 MG/DL (ref 1.6–2.6)

## 2020-09-21 PROCEDURE — 83735 ASSAY OF MAGNESIUM: CPT | Performed by: FAMILY MEDICINE

## 2020-09-21 PROCEDURE — 36415 COLL VENOUS BLD VENIPUNCTURE: CPT | Performed by: FAMILY MEDICINE

## 2020-09-28 ENCOUNTER — HOSPITAL ENCOUNTER (OUTPATIENT)
Dept: ULTRASOUND IMAGING | Age: 65
Discharge: HOME | End: 2020-09-28
Attending: FAMILY MEDICINE
Payer: COMMERCIAL

## 2020-09-28 DIAGNOSIS — E04.2 MULTIPLE THYROID NODULES: ICD-10-CM

## 2020-09-28 PROCEDURE — 76536 US EXAM OF HEAD AND NECK: CPT | Performed by: FAMILY MEDICINE

## 2020-10-12 ENCOUNTER — HOSPITAL ENCOUNTER (OUTPATIENT)
Dept: CARDIOLOGY CLINIC | Facility: HOSPITAL | Age: 65
Discharge: HOME | End: 2020-10-12
Attending: FAMILY MEDICINE
Payer: COMMERCIAL

## 2020-10-12 DIAGNOSIS — I65.23 ATHEROSCLEROSIS OF BOTH CAROTID ARTERIES: ICD-10-CM

## 2020-10-12 PROCEDURE — 93880 EXTRACRANIAL BILAT STUDY: CPT | Performed by: FAMILY MEDICINE

## 2020-10-13 PROBLEM — I65.23 BILATERAL CAROTID ARTERY STENOSIS: Status: ACTIVE | Noted: 2020-10-13

## 2020-10-20 ENCOUNTER — HOSPITAL ENCOUNTER (OUTPATIENT)
Dept: CT IMAGING | Age: 65
Discharge: HOME | End: 2020-10-20
Attending: FAMILY MEDICINE

## 2020-10-20 DIAGNOSIS — Z13.9 ENCOUNTER FOR SCREENING: ICD-10-CM

## 2020-10-22 ENCOUNTER — TELEPHONE (OUTPATIENT)
Dept: CARDIOLOGY | Age: 65
End: 2020-10-22

## 2020-10-22 ENCOUNTER — OFFICE VISIT (OUTPATIENT)
Dept: CARDIOLOGY | Age: 65
End: 2020-10-22

## 2020-10-22 DIAGNOSIS — R93.1 ELEVATED CORONARY ARTERY CALCIUM SCORE: Primary | ICD-10-CM

## 2020-10-22 DIAGNOSIS — I65.23 BILATERAL CAROTID ARTERY STENOSIS: ICD-10-CM

## 2020-10-22 DIAGNOSIS — E78.5 HYPERLIPIDEMIA, UNSPECIFIED HYPERLIPIDEMIA TYPE: ICD-10-CM

## 2020-10-22 DIAGNOSIS — I10 HYPERTENSION, UNSPECIFIED TYPE: ICD-10-CM

## 2020-10-22 DIAGNOSIS — I34.0 NONRHEUMATIC MITRAL VALVE REGURGITATION: ICD-10-CM

## 2020-10-22 DIAGNOSIS — R07.9 CHEST PAIN, UNSPECIFIED TYPE: ICD-10-CM

## 2020-10-22 PROCEDURE — 99243 OFF/OP CNSLTJ NEW/EST LOW 30: CPT | Performed by: INTERNAL MEDICINE

## 2020-10-23 ENCOUNTER — TELEPHONE (OUTPATIENT)
Dept: CARDIOLOGY | Age: 65
End: 2020-10-23

## 2020-10-23 ENCOUNTER — TELEPHONE (OUTPATIENT)
Dept: FAMILY MEDICINE CLINIC | Facility: CLINIC | Age: 65
End: 2020-10-23

## 2020-10-23 DIAGNOSIS — R93.1 ELEVATED CORONARY ARTERY CALCIUM SCORE: Primary | ICD-10-CM

## 2020-10-23 DIAGNOSIS — E78.5 HYPERLIPIDEMIA, UNSPECIFIED HYPERLIPIDEMIA TYPE: ICD-10-CM

## 2020-10-23 NOTE — TELEPHONE ENCOUNTER
Please call patient to advise him of incidental finding on EBCT: CARDIAC INCIDENTAL FINDING: Dilated Thoracic Aorta (  3.94 cm).  If indicated, recommend Gated CT angiogram of thoracic aorta for further evaluation    Patient is seeing cardiology - please ha

## 2020-10-30 ENCOUNTER — TELEPHONE (OUTPATIENT)
Dept: CARDIOLOGY | Age: 65
End: 2020-10-30

## 2020-10-31 ENCOUNTER — HOSPITAL ENCOUNTER (OUTPATIENT)
Dept: GENERAL RADIOLOGY | Age: 65
Discharge: HOME | End: 2020-10-31
Attending: INTERNAL MEDICINE
Payer: COMMERCIAL

## 2020-10-31 ENCOUNTER — APPOINTMENT (OUTPATIENT)
Dept: LAB | Age: 65
End: 2020-10-31
Attending: INTERNAL MEDICINE
Payer: COMMERCIAL

## 2020-10-31 DIAGNOSIS — R93.1 ELEVATED CORONARY ARTERY CALCIUM SCORE: ICD-10-CM

## 2020-10-31 PROCEDURE — 71046 X-RAY EXAM CHEST 2 VIEWS: CPT | Performed by: INTERNAL MEDICINE

## 2020-11-01 LAB
SARS-COV-2 RNA SPEC QL NAA+PROBE: NOT DETECTED
SPECIMEN SOURCE: NORMAL

## 2020-11-03 ENCOUNTER — HOSPITAL ENCOUNTER (OUTPATIENT)
Dept: INTERVENTIONAL RADIOLOGY/VASCULAR | Facility: HOSPITAL | Age: 65
Discharge: HOME | End: 2020-11-03
Attending: INTERNAL MEDICINE | Admitting: INTERNAL MEDICINE
Payer: COMMERCIAL

## 2020-11-03 VITALS
TEMPERATURE: 97 F | DIASTOLIC BLOOD PRESSURE: 83 MMHG | OXYGEN SATURATION: 97 % | HEART RATE: 65 BPM | RESPIRATION RATE: 16 BRPM | SYSTOLIC BLOOD PRESSURE: 120 MMHG

## 2020-11-03 DIAGNOSIS — R93.1 ELEVATED CORONARY ARTERY CALCIUM SCORE: Primary | ICD-10-CM

## 2020-11-03 PROCEDURE — 93571 IV DOP VEL&/PRESS C FLO 1ST: CPT | Performed by: INTERNAL MEDICINE

## 2020-11-03 PROCEDURE — 93571 IV DOP VEL&/PRESS C FLO 1ST: CPT

## 2020-11-03 PROCEDURE — 93005 ELECTROCARDIOGRAM TRACING: CPT

## 2020-11-03 PROCEDURE — 4A023N7 MEASUREMENT OF CARDIAC SAMPLING AND PRESSURE, LEFT HEART, PERCUTANEOUS APPROACH: ICD-10-PCS | Performed by: INTERNAL MEDICINE

## 2020-11-03 PROCEDURE — 93010 ELECTROCARDIOGRAM REPORT: CPT | Performed by: INTERNAL MEDICINE

## 2020-11-03 PROCEDURE — 93458 L HRT ARTERY/VENTRICLE ANGIO: CPT | Performed by: INTERNAL MEDICINE

## 2020-11-03 PROCEDURE — B2151ZZ FLUOROSCOPY OF LEFT HEART USING LOW OSMOLAR CONTRAST: ICD-10-PCS | Performed by: INTERNAL MEDICINE

## 2020-11-03 PROCEDURE — B2111ZZ FLUOROSCOPY OF MULTIPLE CORONARY ARTERIES USING LOW OSMOLAR CONTRAST: ICD-10-PCS | Performed by: INTERNAL MEDICINE

## 2020-11-03 PROCEDURE — 93458 L HRT ARTERY/VENTRICLE ANGIO: CPT

## 2020-11-03 PROCEDURE — 4A033BC MEASUREMENT OF ARTERIAL PRESSURE, CORONARY, PERCUTANEOUS APPROACH: ICD-10-PCS | Performed by: INTERNAL MEDICINE

## 2020-11-03 RX ORDER — CLOPIDOGREL BISULFATE 75 MG/1
75 TABLET ORAL DAILY
Qty: 90 TABLET | Refills: 0 | Status: SHIPPED | OUTPATIENT
Start: 2020-11-03

## 2020-11-03 RX ORDER — ASPIRIN 325 MG
TABLET, DELAYED RELEASE (ENTERIC COATED) ORAL
Status: COMPLETED
Start: 2020-11-03 | End: 2020-11-03

## 2020-11-03 RX ORDER — ASPIRIN 81 MG/1
81 TABLET ORAL DAILY
Qty: 60 TABLET | Refills: 0 | Status: SHIPPED
Start: 2020-11-03

## 2020-11-03 RX ORDER — ADENOSINE 3 MG/ML
INJECTION, SOLUTION INTRAVENOUS
Status: COMPLETED
Start: 2020-11-03 | End: 2020-11-03

## 2020-11-03 RX ORDER — MIDAZOLAM HYDROCHLORIDE 1 MG/ML
INJECTION INTRAMUSCULAR; INTRAVENOUS
Status: COMPLETED
Start: 2020-11-03 | End: 2020-11-03

## 2020-11-03 RX ORDER — LIDOCAINE HYDROCHLORIDE 10 MG/ML
INJECTION, SOLUTION EPIDURAL; INFILTRATION; INTRACAUDAL; PERINEURAL
Status: COMPLETED
Start: 2020-11-03 | End: 2020-11-03

## 2020-11-03 RX ORDER — HEPARIN SODIUM 5000 [USP'U]/ML
INJECTION, SOLUTION INTRAVENOUS; SUBCUTANEOUS
Status: COMPLETED
Start: 2020-11-03 | End: 2020-11-03

## 2020-11-03 RX ORDER — SODIUM CHLORIDE 9 MG/ML
INJECTION, SOLUTION INTRAVENOUS
Status: DISCONTINUED | OUTPATIENT
Start: 2020-11-04 | End: 2020-11-03 | Stop reason: HOSPADM

## 2020-11-03 NOTE — H&P
History & Physical Examination    Patient Name: Samy Shabazz  MRN: [id number]  CSN: [id number]  YOB: 1955    Diagnosis: abnormal ultra fast CT calcium score.      Present Illness:     60year old male presents to the hospital for a sched SHOULDER WITH ACROMIOPLASTY DISTAL CLAVICLE Left 1/31/2020    Performed by Latonya Yung MD at [facility]   • COLONOSCOPY  03/13/2009   • HEMORRHOIDECTOMY  10/2017   • HEMORRHOIDECTOMY N/A 10/27/2017    Performed by Joan Morataya MD at

## 2020-11-03 NOTE — PROGRESS NOTES
Pt arrived from lab via bed.see assmt. Pt instructed on activity restrictions. Call light in reach [facility] flat. Po fluids offered. 1400: hob elevated and groin unchanged    14[address] Dr Ardeen Fabry here, [facility] in [address] reviewed w pt and spouse.  Discharge instructions reviewed

## 2020-11-03 NOTE — PROCEDURES
[facility]    PATIENT'S NAME: Michael Dodge   ATTENDING PHYSICIAN: Yonathan Shelton M.D. OPERATING PHYSICIAN: Yonathan Shelton M.D.    PATIENT ACCOUNT#:   [de-identified]    LOCATION:  [facility] 3 EDWP  MEDICAL RECORD #:   [id number]       D

## 2020-11-04 ENCOUNTER — TELEPHONE (OUTPATIENT)
Dept: CARDIOLOGY | Age: 65
End: 2020-11-04

## 2020-11-04 DIAGNOSIS — R07.9 CHEST PAIN, UNSPECIFIED TYPE: ICD-10-CM

## 2020-11-04 DIAGNOSIS — I25.10 ATHEROSCLEROSIS OF NATIVE CORONARY ARTERY OF NATIVE HEART WITHOUT ANGINA PECTORIS: Primary | ICD-10-CM

## 2020-11-06 ENCOUNTER — APPOINTMENT (OUTPATIENT)
Dept: LAB | Age: 65
End: 2020-11-06
Attending: INTERNAL MEDICINE
Payer: COMMERCIAL

## 2020-11-06 DIAGNOSIS — I25.10 CAD (CORONARY ARTERY DISEASE): ICD-10-CM

## 2020-11-07 LAB
SARS-COV-2 RNA SPEC QL NAA+PROBE: NOT DETECTED
SPECIMEN SOURCE: NORMAL

## 2020-11-09 ENCOUNTER — HOSPITAL ENCOUNTER (OUTPATIENT)
Dept: INTERVENTIONAL RADIOLOGY/VASCULAR | Facility: HOSPITAL | Age: 65
Discharge: HOME | End: 2020-11-09
Attending: INTERNAL MEDICINE | Admitting: INTERNAL MEDICINE
Payer: COMMERCIAL

## 2020-11-09 VITALS
WEIGHT: 177 LBS | OXYGEN SATURATION: 96 % | HEART RATE: 63 BPM | HEIGHT: 68 IN | SYSTOLIC BLOOD PRESSURE: 109 MMHG | DIASTOLIC BLOOD PRESSURE: 81 MMHG | BODY MASS INDEX: 26.83 KG/M2 | RESPIRATION RATE: 20 BRPM | TEMPERATURE: 98 F

## 2020-11-09 DIAGNOSIS — I25.10 CAD (CORONARY ARTERY DISEASE): Primary | ICD-10-CM

## 2020-11-09 PROCEDURE — 93010 ELECTROCARDIOGRAM REPORT: CPT | Performed by: INTERNAL MEDICINE

## 2020-11-09 PROCEDURE — 99211 OFF/OP EST MAY X REQ PHY/QHP: CPT

## 2020-11-09 PROCEDURE — 85347 COAGULATION TIME ACTIVATED: CPT

## 2020-11-09 PROCEDURE — 93005 ELECTROCARDIOGRAM TRACING: CPT

## 2020-11-09 PROCEDURE — 99153 MOD SED SAME PHYS/QHP EA: CPT

## 2020-11-09 PROCEDURE — 92979 ENDOLUMINL IVUS OCT C EA: CPT

## 2020-11-09 PROCEDURE — 92933 PRQ TRLML C ATHRC ST ANGIOP1: CPT | Performed by: INTERNAL MEDICINE

## 2020-11-09 PROCEDURE — 99152 MOD SED SAME PHYS/QHP 5/>YRS: CPT

## 2020-11-09 PROCEDURE — 92978 ENDOLUMINL IVUS OCT C 1ST: CPT | Performed by: INTERNAL MEDICINE

## 2020-11-09 PROCEDURE — 92978 ENDOLUMINL IVUS OCT C 1ST: CPT

## 2020-11-09 RX ORDER — SODIUM CHLORIDE 9 MG/ML
INJECTION, SOLUTION INTRAVENOUS CONTINUOUS
Status: ACTIVE | OUTPATIENT
Start: 2020-11-09 | End: 2020-11-09

## 2020-11-09 RX ORDER — CLOPIDOGREL BISULFATE 75 MG/1
TABLET ORAL
Status: COMPLETED
Start: 2020-11-09 | End: 2020-11-09

## 2020-11-09 RX ORDER — SODIUM CHLORIDE 9 MG/ML
INJECTION, SOLUTION INTRAVENOUS
Status: DISCONTINUED | OUTPATIENT
Start: 2020-11-10 | End: 2020-11-09 | Stop reason: HOSPADM

## 2020-11-09 RX ORDER — ROSUVASTATIN CALCIUM 10 MG/1
10 TABLET, COATED ORAL NIGHTLY
Qty: 30 TABLET | Refills: 3 | Status: SHIPPED | OUTPATIENT
Start: 2020-11-09 | End: 2020-11-09

## 2020-11-09 RX ORDER — HEPARIN SODIUM 5000 [USP'U]/ML
INJECTION, SOLUTION INTRAVENOUS; SUBCUTANEOUS
Status: COMPLETED
Start: 2020-11-09 | End: 2020-11-09

## 2020-11-09 RX ORDER — LIDOCAINE HYDROCHLORIDE 10 MG/ML
INJECTION, SOLUTION EPIDURAL; INFILTRATION; INTRACAUDAL; PERINEURAL
Status: COMPLETED
Start: 2020-11-09 | End: 2020-11-09

## 2020-11-09 RX ORDER — ONDANSETRON 2 MG/ML
INJECTION INTRAMUSCULAR; INTRAVENOUS
Status: COMPLETED
Start: 2020-11-09 | End: 2020-11-09

## 2020-11-09 RX ORDER — CLOPIDOGREL BISULFATE 75 MG/1
75 TABLET ORAL DAILY
Status: DISCONTINUED | OUTPATIENT
Start: 2020-11-10 | End: 2020-11-09

## 2020-11-09 RX ORDER — ROSUVASTATIN CALCIUM 10 MG/1
10 TABLET, COATED ORAL NIGHTLY
Qty: 30 TABLET | Refills: 3 | Status: SHIPPED
Start: 2020-11-09

## 2020-11-09 RX ORDER — MIDAZOLAM HYDROCHLORIDE 1 MG/ML
INJECTION INTRAMUSCULAR; INTRAVENOUS
Status: COMPLETED
Start: 2020-11-09 | End: 2020-11-09

## 2020-11-09 RX ORDER — ROSUVASTATIN CALCIUM 10 MG/1
10 TABLET, COATED ORAL NIGHTLY
Status: DISCONTINUED | OUTPATIENT
Start: 2020-11-09 | End: 2020-11-09

## 2020-11-09 RX ORDER — ASPIRIN 81 MG/1
81 TABLET ORAL DAILY
Status: DISCONTINUED | OUTPATIENT
Start: 2020-11-10 | End: 2020-11-09

## 2020-11-09 NOTE — PROGRESS NOTES
Here for PCI, no new complaints, taking asa and plavix. APN called for H and P. Wife is at bedside. 1057 s/p PCI same day D/C, 12 lead EKG done, complains of heart burn, was given plavix in the cath lab, given david mist and crackers.  After soda, stat

## 2020-11-09 NOTE — PROCEDURES
[facility]    PATIENT'S NAME: Gee Regalado   ATTENDING PHYSICIAN: Saranya Wade M.D. OPERATING PHYSICIAN: Saranya Wade M.D.    PATIENT ACCOUNT#:   [de-identified]    LOCATION:  [facility] 15 ED  MEDICAL RECORD #:   [id number]

## 2020-11-09 NOTE — PROGRESS NOTES
History & Physical Examination    Patient Name: Howie Patricio  MRN: [id number]  CSN: [id number]  YOB: 1955    CAD:    60year old male presents to the hospital for a scheduled C w plan for PCI intervention today.  Patient recently had a Sprain of thoracic region    • Unspecified disorder of skin and subcutaneous tissue      Past Surgical History:   Procedure Laterality Date   • ARTHROSCOPY SHOULDER WITH ACROMIOPLASTY DISTAL CLAVICLE Left 1/31/2020    Performed by Saul Arechiga MD at

## 2020-11-23 PROBLEM — Z95.5 PRESENCE OF DRUG COATED STENT IN LAD CORONARY ARTERY: Status: ACTIVE | Noted: 2020-11-23

## 2020-11-23 PROBLEM — I25.10 CORONARY ARTERY DISEASE INVOLVING NATIVE CORONARY ARTERY OF NATIVE HEART WITHOUT ANGINA PECTORIS: Status: ACTIVE | Noted: 2020-11-23

## 2020-11-23 PROBLEM — I34.0 MITRAL VALVE INSUFFICIENCY: Status: ACTIVE | Noted: 2020-09-15

## 2020-11-23 PROBLEM — I77.1 TORTUOUS AORTA (CMD): Status: ACTIVE | Noted: 2018-01-31

## 2020-11-23 PROBLEM — I65.23 ATHEROSCLEROSIS OF BOTH CAROTID ARTERIES: Status: ACTIVE | Noted: 2020-09-15

## 2020-11-23 RX ORDER — LISINOPRIL 5 MG/1
5 TABLET ORAL DAILY
COMMUNITY
Start: 2020-09-01

## 2020-11-23 RX ORDER — ASPIRIN 81 MG/1
81 TABLET ORAL DAILY
COMMUNITY
Start: 2020-11-03

## 2020-11-23 RX ORDER — CLOPIDOGREL BISULFATE 75 MG/1
75 TABLET ORAL DAILY
COMMUNITY
Start: 2020-11-03 | End: 2021-01-27

## 2020-11-23 RX ORDER — ROSUVASTATIN CALCIUM 10 MG/1
10 TABLET, COATED ORAL DAILY
COMMUNITY
Start: 2020-11-09 | End: 2021-05-20

## 2020-11-24 ENCOUNTER — OFFICE VISIT (OUTPATIENT)
Dept: CARDIOLOGY | Age: 65
End: 2020-11-24

## 2020-11-24 VITALS
BODY MASS INDEX: 27.58 KG/M2 | SYSTOLIC BLOOD PRESSURE: 110 MMHG | DIASTOLIC BLOOD PRESSURE: 70 MMHG | WEIGHT: 182 LBS | HEART RATE: 72 BPM | HEIGHT: 68 IN

## 2020-11-24 DIAGNOSIS — I25.10 CORONARY ARTERY DISEASE INVOLVING NATIVE CORONARY ARTERY OF NATIVE HEART WITHOUT ANGINA PECTORIS: ICD-10-CM

## 2020-11-24 DIAGNOSIS — I65.23 ATHEROSCLEROSIS OF BOTH CAROTID ARTERIES: Primary | ICD-10-CM

## 2020-11-24 DIAGNOSIS — I34.0 MITRAL VALVE INSUFFICIENCY, UNSPECIFIED ETIOLOGY: ICD-10-CM

## 2020-11-24 DIAGNOSIS — I10 BENIGN ESSENTIAL HYPERTENSION: ICD-10-CM

## 2020-11-24 DIAGNOSIS — Z95.5 PRESENCE OF DRUG COATED STENT IN LAD CORONARY ARTERY: ICD-10-CM

## 2020-11-24 DIAGNOSIS — I77.1 TORTUOUS AORTA (CMD): ICD-10-CM

## 2020-11-24 PROCEDURE — 99214 OFFICE O/P EST MOD 30 MIN: CPT | Performed by: NURSE PRACTITIONER

## 2020-11-24 PROCEDURE — 3078F DIAST BP <80 MM HG: CPT | Performed by: NURSE PRACTITIONER

## 2020-11-24 PROCEDURE — 3074F SYST BP LT 130 MM HG: CPT | Performed by: NURSE PRACTITIONER

## 2020-11-24 SDOH — HEALTH STABILITY: PHYSICAL HEALTH: ON AVERAGE, HOW MANY DAYS PER WEEK DO YOU ENGAGE IN MODERATE TO STRENUOUS EXERCISE (LIKE A BRISK WALK)?: 0 DAYS

## 2020-11-24 SDOH — HEALTH STABILITY: PHYSICAL HEALTH: ON AVERAGE, HOW MANY MINUTES DO YOU ENGAGE IN EXERCISE AT THIS LEVEL?: 0 MIN

## 2020-11-24 ASSESSMENT — PATIENT HEALTH QUESTIONNAIRE - PHQ9
SUM OF ALL RESPONSES TO PHQ9 QUESTIONS 1 AND 2: 0
CLINICAL INTERPRETATION OF PHQ2 SCORE: NO FURTHER SCREENING NEEDED
CLINICAL INTERPRETATION OF PHQ9 SCORE: NO FURTHER SCREENING NEEDED
1. LITTLE INTEREST OR PLEASURE IN DOING THINGS: NOT AT ALL
2. FEELING DOWN, DEPRESSED OR HOPELESS: NOT AT ALL
SUM OF ALL RESPONSES TO PHQ9 QUESTIONS 1 AND 2: 0

## 2020-11-24 ASSESSMENT — ENCOUNTER SYMPTOMS
NIGHT SWEATS: 0
ABDOMINAL PAIN: 0
SHORTNESS OF BREATH: 0
ORTHOPNEA: 0
COUGH: 0
NEAR-SYNCOPE: 0
BLOATING: 0
FEVER: 0
SYNCOPE: 0

## 2020-12-09 ENCOUNTER — HOSPITAL ENCOUNTER (OUTPATIENT)
Dept: CARDIOLOGY CLINIC | Facility: HOSPITAL | Age: 65
Discharge: HOME | End: 2020-12-09
Attending: INTERNAL MEDICINE

## 2020-12-09 DIAGNOSIS — Z13.9 ENCOUNTER FOR SCREENING: ICD-10-CM

## 2020-12-11 ENCOUNTER — E-ADVICE (OUTPATIENT)
Dept: CARDIOLOGY | Age: 65
End: 2020-12-11

## 2021-01-01 ENCOUNTER — EXTERNAL RECORD (OUTPATIENT)
Dept: OTHER | Age: 66
End: 2021-01-01

## 2021-01-20 NOTE — H&P
[facility]    PATIENT'S NAME: Saul Prospect   ATTENDING PHYSICIAN: Inez Kwan M.D.    PATIENT ACCOUNT#:   [de-identified]    LOCATION:  [address]  MEDICAL RECORD #:   [id number]       YOB: 1955  ADMISSION DATE:

## 2021-01-25 ENCOUNTER — TELEPHONE (OUTPATIENT)
Dept: CARDIOLOGY | Age: 66
End: 2021-01-25

## 2021-01-27 RX ORDER — CLOPIDOGREL BISULFATE 75 MG/1
TABLET ORAL
Qty: 90 TABLET | Refills: 2 | Status: SHIPPED | OUTPATIENT
Start: 2021-01-27

## 2021-01-29 ENCOUNTER — E-ADVICE (OUTPATIENT)
Dept: CARDIOLOGY | Age: 66
End: 2021-01-29

## 2021-02-01 ENCOUNTER — CARDPULM VISIT (OUTPATIENT)
Dept: CARDIAC REHAB | Facility: HOSPITAL | Age: 66
End: 2021-02-01
Attending: INTERNAL MEDICINE
Payer: COMMERCIAL

## 2021-02-01 PROCEDURE — 93798 PHYS/QHP OP CAR RHAB W/ECG: CPT

## 2021-02-02 ENCOUNTER — TELEPHONE (OUTPATIENT)
Dept: CARDIOLOGY | Age: 66
End: 2021-02-02

## 2021-02-03 ENCOUNTER — CARDPULM VISIT (OUTPATIENT)
Dept: CARDIAC REHAB | Facility: HOSPITAL | Age: 66
End: 2021-02-03
Attending: INTERNAL MEDICINE
Payer: COMMERCIAL

## 2021-02-03 PROCEDURE — 93798 PHYS/QHP OP CAR RHAB W/ECG: CPT

## 2021-02-05 ENCOUNTER — CARDPULM VISIT (OUTPATIENT)
Dept: CARDIAC REHAB | Facility: HOSPITAL | Age: 66
End: 2021-02-05
Attending: INTERNAL MEDICINE
Payer: COMMERCIAL

## 2021-02-05 PROCEDURE — 93798 PHYS/QHP OP CAR RHAB W/ECG: CPT

## 2021-02-08 ENCOUNTER — CARDPULM VISIT (OUTPATIENT)
Dept: CARDIAC REHAB | Facility: HOSPITAL | Age: 66
End: 2021-02-08
Attending: INTERNAL MEDICINE
Payer: COMMERCIAL

## 2021-02-08 PROCEDURE — 93798 PHYS/QHP OP CAR RHAB W/ECG: CPT

## 2021-02-10 ENCOUNTER — E-ADVICE (OUTPATIENT)
Dept: CARDIOLOGY | Age: 66
End: 2021-02-10

## 2021-02-10 ENCOUNTER — CARDPULM VISIT (OUTPATIENT)
Dept: CARDIAC REHAB | Facility: HOSPITAL | Age: 66
End: 2021-02-10
Attending: INTERNAL MEDICINE
Payer: COMMERCIAL

## 2021-02-10 PROCEDURE — 93798 PHYS/QHP OP CAR RHAB W/ECG: CPT

## 2021-02-12 ENCOUNTER — CARDPULM VISIT (OUTPATIENT)
Dept: CARDIAC REHAB | Facility: HOSPITAL | Age: 66
End: 2021-02-12
Attending: INTERNAL MEDICINE
Payer: COMMERCIAL

## 2021-02-12 PROCEDURE — 93798 PHYS/QHP OP CAR RHAB W/ECG: CPT

## 2021-02-15 ENCOUNTER — CARDPULM VISIT (OUTPATIENT)
Dept: CARDIAC REHAB | Facility: HOSPITAL | Age: 66
End: 2021-02-15
Attending: INTERNAL MEDICINE
Payer: COMMERCIAL

## 2021-02-15 PROCEDURE — 93798 PHYS/QHP OP CAR RHAB W/ECG: CPT

## 2021-02-17 ENCOUNTER — CARDPULM VISIT (OUTPATIENT)
Dept: CARDIAC REHAB | Facility: HOSPITAL | Age: 66
End: 2021-02-17
Attending: INTERNAL MEDICINE
Payer: COMMERCIAL

## 2021-02-17 PROCEDURE — 93798 PHYS/QHP OP CAR RHAB W/ECG: CPT

## 2021-02-18 ENCOUNTER — OFFICE VISIT (OUTPATIENT)
Dept: FAMILY MEDICINE CLINIC | Facility: CLINIC | Age: 66
End: 2021-02-18
Payer: COMMERCIAL

## 2021-02-18 VITALS
BODY MASS INDEX: 27.43 KG/M2 | SYSTOLIC BLOOD PRESSURE: 124 MMHG | WEIGHT: 181 LBS | DIASTOLIC BLOOD PRESSURE: 74 MMHG | HEIGHT: 68 IN | HEART RATE: 72 BPM | RESPIRATION RATE: 18 BRPM

## 2021-02-18 DIAGNOSIS — B02.9 HERPES ZOSTER WITHOUT COMPLICATION: Primary | ICD-10-CM

## 2021-02-18 PROCEDURE — 99213 OFFICE O/P EST LOW 20 MIN: CPT | Performed by: FAMILY MEDICINE

## 2021-02-18 PROCEDURE — 3078F DIAST BP <80 MM HG: CPT | Performed by: FAMILY MEDICINE

## 2021-02-18 PROCEDURE — 3074F SYST BP LT 130 MM HG: CPT | Performed by: FAMILY MEDICINE

## 2021-02-18 PROCEDURE — 3008F BODY MASS INDEX DOCD: CPT | Performed by: FAMILY MEDICINE

## 2021-02-18 RX ORDER — VALACYCLOVIR HYDROCHLORIDE 1 G/1
1 TABLET, FILM COATED ORAL 3 TIMES DAILY
Qty: 21 TABLET | Refills: 0 | Status: SHIPPED | OUTPATIENT
Start: 2021-02-18 | End: 2021-02-25

## 2021-02-19 ENCOUNTER — APPOINTMENT (OUTPATIENT)
Dept: CARDIAC REHAB | Facility: HOSPITAL | Age: 66
End: 2021-02-19
Attending: INTERNAL MEDICINE
Payer: COMMERCIAL

## 2021-02-22 ENCOUNTER — APPOINTMENT (OUTPATIENT)
Dept: CARDIAC REHAB | Facility: HOSPITAL | Age: 66
End: 2021-02-22
Attending: INTERNAL MEDICINE
Payer: COMMERCIAL

## 2021-02-22 ENCOUNTER — TELEPHONE (OUTPATIENT)
Dept: FAMILY MEDICINE CLINIC | Facility: CLINIC | Age: 66
End: 2021-02-22

## 2021-02-22 NOTE — TELEPHONE ENCOUNTER
T.C. to pt. He states' Overall I am feeling better. \" I asked him if he has any eye pain, swelling or visual problems. He stated his left eye feels a little sore. He does not have any pain. He does have some tingling and pressure around his eye.  His eye do

## 2021-02-22 NOTE — PROGRESS NOTES
Howie Patricio is a 60year old male. HPI:   Patient is a 67-year-old male who has noted pain in his left parietal area with radiation forward. He has noted several vesicles on his buccal mucosa and tongue on the left.   Current Outpatient Medication Small lesion noted left buccal mucosa and left tip of tongue  NECK: supple,no adenopathy,no bruits      ASSESSMENT AND PLAN:     Herpes zoster without complication  (primary encounter diagnosis)    No orders of the defined types were placed in this encount

## 2021-02-24 ENCOUNTER — APPOINTMENT (OUTPATIENT)
Dept: CARDIAC REHAB | Facility: HOSPITAL | Age: 66
End: 2021-02-24
Attending: INTERNAL MEDICINE
Payer: COMMERCIAL

## 2021-02-26 ENCOUNTER — APPOINTMENT (OUTPATIENT)
Dept: CARDIAC REHAB | Facility: HOSPITAL | Age: 66
End: 2021-02-26
Attending: INTERNAL MEDICINE
Payer: COMMERCIAL

## 2021-03-01 ENCOUNTER — CARDPULM VISIT (OUTPATIENT)
Dept: CARDIAC REHAB | Facility: HOSPITAL | Age: 66
End: 2021-03-01
Attending: INTERNAL MEDICINE
Payer: COMMERCIAL

## 2021-03-01 PROCEDURE — 93798 PHYS/QHP OP CAR RHAB W/ECG: CPT

## 2021-03-03 ENCOUNTER — CARDPULM VISIT (OUTPATIENT)
Dept: CARDIAC REHAB | Facility: HOSPITAL | Age: 66
End: 2021-03-03
Attending: INTERNAL MEDICINE
Payer: COMMERCIAL

## 2021-03-03 PROCEDURE — 93798 PHYS/QHP OP CAR RHAB W/ECG: CPT

## 2021-03-04 ENCOUNTER — LAB ENCOUNTER (OUTPATIENT)
Dept: LAB | Age: 66
End: 2021-03-04
Attending: NURSE PRACTITIONER
Payer: COMMERCIAL

## 2021-03-04 DIAGNOSIS — E78.5 HYPERLIPIDEMIA, UNSPECIFIED HYPERLIPIDEMIA TYPE: ICD-10-CM

## 2021-03-04 DIAGNOSIS — I25.10 CAD (CORONARY ARTERY DISEASE): ICD-10-CM

## 2021-03-04 DIAGNOSIS — R93.1 ELEVATED CORONARY ARTERY CALCIUM SCORE: ICD-10-CM

## 2021-03-04 LAB
ALBUMIN SERPL-MCNC: 3.6 G/DL (ref 3.4–5)
ALBUMIN/GLOB SERPL: 1.1 {RATIO} (ref 1–2)
ALP LIVER SERPL-CCNC: 27 U/L
ALT SERPL-CCNC: 66 U/L
ANION GAP SERPL CALC-SCNC: 5 MMOL/L (ref 0–18)
AST SERPL-CCNC: 30 U/L (ref 15–37)
BILIRUB SERPL-MCNC: 0.7 MG/DL (ref 0.1–2)
BUN BLD-MCNC: 14 MG/DL (ref 7–18)
BUN/CREAT SERPL: 14.6 (ref 10–20)
CALCIUM BLD-MCNC: 8.9 MG/DL (ref 8.5–10.1)
CHLORIDE SERPL-SCNC: 107 MMOL/L (ref 98–112)
CHOLEST SMN-MCNC: 158 MG/DL (ref ?–200)
CO2 SERPL-SCNC: 28 MMOL/L (ref 21–32)
CREAT BLD-MCNC: 0.96 MG/DL
GLOBULIN PLAS-MCNC: 3.4 G/DL (ref 2.8–4.4)
GLUCOSE BLD-MCNC: 96 MG/DL (ref 70–99)
HDLC SERPL-MCNC: 45 MG/DL (ref 40–59)
LDLC SERPL CALC-MCNC: 88 MG/DL (ref ?–100)
M PROTEIN MFR SERPL ELPH: 7 G/DL (ref 6.4–8.2)
NONHDLC SERPL-MCNC: 113 MG/DL (ref ?–130)
OSMOLALITY SERPL CALC.SUM OF ELEC: 290 MOSM/KG (ref 275–295)
PATIENT FASTING Y/N/NP: YES
PATIENT FASTING Y/N/NP: YES
POTASSIUM SERPL-SCNC: 4.1 MMOL/L (ref 3.5–5.1)
SODIUM SERPL-SCNC: 140 MMOL/L (ref 136–145)
TRIGL SERPL-MCNC: 126 MG/DL (ref 30–149)
VLDLC SERPL CALC-MCNC: 25 MG/DL (ref 0–30)

## 2021-03-04 PROCEDURE — 36415 COLL VENOUS BLD VENIPUNCTURE: CPT

## 2021-03-04 PROCEDURE — 80061 LIPID PANEL: CPT

## 2021-03-04 PROCEDURE — 80053 COMPREHEN METABOLIC PANEL: CPT

## 2021-03-05 ENCOUNTER — CARDPULM VISIT (OUTPATIENT)
Dept: CARDIAC REHAB | Facility: HOSPITAL | Age: 66
End: 2021-03-05
Attending: INTERNAL MEDICINE
Payer: COMMERCIAL

## 2021-03-05 ENCOUNTER — TELEPHONE (OUTPATIENT)
Dept: FAMILY MEDICINE CLINIC | Facility: CLINIC | Age: 66
End: 2021-03-05

## 2021-03-05 DIAGNOSIS — R79.89 ELEVATED LFTS: Primary | ICD-10-CM

## 2021-03-05 PROCEDURE — 93798 PHYS/QHP OP CAR RHAB W/ECG: CPT

## 2021-03-08 ENCOUNTER — CARDPULM VISIT (OUTPATIENT)
Dept: CARDIAC REHAB | Facility: HOSPITAL | Age: 66
End: 2021-03-08
Attending: INTERNAL MEDICINE
Payer: COMMERCIAL

## 2021-03-08 ENCOUNTER — OFFICE VISIT (OUTPATIENT)
Dept: CARDIOLOGY | Age: 66
End: 2021-03-08

## 2021-03-08 VITALS
HEART RATE: 64 BPM | SYSTOLIC BLOOD PRESSURE: 128 MMHG | WEIGHT: 180 LBS | HEIGHT: 68 IN | BODY MASS INDEX: 27.28 KG/M2 | DIASTOLIC BLOOD PRESSURE: 74 MMHG

## 2021-03-08 DIAGNOSIS — I10 BENIGN ESSENTIAL HYPERTENSION: ICD-10-CM

## 2021-03-08 DIAGNOSIS — Z95.5 PRESENCE OF DRUG COATED STENT IN LAD CORONARY ARTERY: ICD-10-CM

## 2021-03-08 DIAGNOSIS — I65.23 ATHEROSCLEROSIS OF BOTH CAROTID ARTERIES: Primary | ICD-10-CM

## 2021-03-08 DIAGNOSIS — I25.10 CORONARY ARTERY DISEASE INVOLVING NATIVE CORONARY ARTERY OF NATIVE HEART WITHOUT ANGINA PECTORIS: ICD-10-CM

## 2021-03-08 PROCEDURE — 3078F DIAST BP <80 MM HG: CPT | Performed by: INTERNAL MEDICINE

## 2021-03-08 PROCEDURE — 99204 OFFICE O/P NEW MOD 45 MIN: CPT | Performed by: INTERNAL MEDICINE

## 2021-03-08 PROCEDURE — 3074F SYST BP LT 130 MM HG: CPT | Performed by: INTERNAL MEDICINE

## 2021-03-08 PROCEDURE — 93798 PHYS/QHP OP CAR RHAB W/ECG: CPT

## 2021-03-08 ASSESSMENT — ENCOUNTER SYMPTOMS
SUSPICIOUS LESIONS: 0
FEVER: 0
COUGH: 0
WEIGHT LOSS: 0
ALLERGIC/IMMUNOLOGIC COMMENTS: NO NEW FOOD ALLERGIES
BRUISES/BLEEDS EASILY: 0
HEMATOCHEZIA: 0
HEMOPTYSIS: 0
CHILLS: 0
WEIGHT GAIN: 0

## 2021-03-08 ASSESSMENT — PATIENT HEALTH QUESTIONNAIRE - PHQ9
SUM OF ALL RESPONSES TO PHQ9 QUESTIONS 1 AND 2: 0
1. LITTLE INTEREST OR PLEASURE IN DOING THINGS: NOT AT ALL
SUM OF ALL RESPONSES TO PHQ9 QUESTIONS 1 AND 2: 0
SUM OF ALL RESPONSES TO PHQ9 QUESTIONS 1 AND 2: 0
CLINICAL INTERPRETATION OF PHQ9 SCORE: NO FURTHER SCREENING NEEDED
1. LITTLE INTEREST OR PLEASURE IN DOING THINGS: NOT AT ALL
2. FEELING DOWN, DEPRESSED OR HOPELESS: NOT AT ALL
CLINICAL INTERPRETATION OF PHQ2 SCORE: NO FURTHER SCREENING NEEDED
2. FEELING DOWN, DEPRESSED OR HOPELESS: NOT AT ALL
CLINICAL INTERPRETATION OF PHQ2 SCORE: NO FURTHER SCREENING NEEDED

## 2021-03-10 ENCOUNTER — CARDPULM VISIT (OUTPATIENT)
Dept: CARDIAC REHAB | Facility: HOSPITAL | Age: 66
End: 2021-03-10
Attending: INTERNAL MEDICINE
Payer: COMMERCIAL

## 2021-03-10 PROCEDURE — 93798 PHYS/QHP OP CAR RHAB W/ECG: CPT

## 2021-03-12 ENCOUNTER — CARDPULM VISIT (OUTPATIENT)
Dept: CARDIAC REHAB | Facility: HOSPITAL | Age: 66
End: 2021-03-12
Attending: INTERNAL MEDICINE
Payer: COMMERCIAL

## 2021-03-12 ENCOUNTER — ORDER TRANSCRIPTION (OUTPATIENT)
Dept: ADMINISTRATIVE | Facility: HOSPITAL | Age: 66
End: 2021-03-12

## 2021-03-12 DIAGNOSIS — Z01.818 PREOP EXAMINATION: Primary | ICD-10-CM

## 2021-03-12 DIAGNOSIS — Z11.59 ENCOUNTER FOR SCREENING FOR OTHER VIRAL DISEASES: ICD-10-CM

## 2021-03-12 PROCEDURE — 93798 PHYS/QHP OP CAR RHAB W/ECG: CPT

## 2021-03-15 ENCOUNTER — APPOINTMENT (OUTPATIENT)
Dept: CARDIAC REHAB | Facility: HOSPITAL | Age: 66
End: 2021-03-15
Attending: INTERNAL MEDICINE
Payer: COMMERCIAL

## 2021-03-17 ENCOUNTER — CARDPULM VISIT (OUTPATIENT)
Dept: CARDIAC REHAB | Facility: HOSPITAL | Age: 66
End: 2021-03-17
Attending: INTERNAL MEDICINE
Payer: COMMERCIAL

## 2021-03-17 PROCEDURE — 93798 PHYS/QHP OP CAR RHAB W/ECG: CPT

## 2021-03-19 ENCOUNTER — CARDPULM VISIT (OUTPATIENT)
Dept: CARDIAC REHAB | Facility: HOSPITAL | Age: 66
End: 2021-03-19
Attending: INTERNAL MEDICINE
Payer: COMMERCIAL

## 2021-03-19 PROCEDURE — 93798 PHYS/QHP OP CAR RHAB W/ECG: CPT

## 2021-03-22 ENCOUNTER — CARDPULM VISIT (OUTPATIENT)
Dept: CARDIAC REHAB | Facility: HOSPITAL | Age: 66
End: 2021-03-22
Attending: INTERNAL MEDICINE
Payer: COMMERCIAL

## 2021-03-22 PROCEDURE — 93798 PHYS/QHP OP CAR RHAB W/ECG: CPT

## 2021-03-24 ENCOUNTER — CARDPULM VISIT (OUTPATIENT)
Dept: CARDIAC REHAB | Facility: HOSPITAL | Age: 66
End: 2021-03-24
Attending: INTERNAL MEDICINE
Payer: COMMERCIAL

## 2021-03-24 PROCEDURE — 93798 PHYS/QHP OP CAR RHAB W/ECG: CPT

## 2021-03-26 ENCOUNTER — CARDPULM VISIT (OUTPATIENT)
Dept: CARDIAC REHAB | Facility: HOSPITAL | Age: 66
End: 2021-03-26
Attending: INTERNAL MEDICINE
Payer: COMMERCIAL

## 2021-03-26 PROCEDURE — 93798 PHYS/QHP OP CAR RHAB W/ECG: CPT

## 2021-03-29 ENCOUNTER — CARDPULM VISIT (OUTPATIENT)
Dept: CARDIAC REHAB | Facility: HOSPITAL | Age: 66
End: 2021-03-29
Attending: INTERNAL MEDICINE
Payer: COMMERCIAL

## 2021-03-29 PROCEDURE — 93798 PHYS/QHP OP CAR RHAB W/ECG: CPT

## 2021-03-31 ENCOUNTER — CARDPULM VISIT (OUTPATIENT)
Dept: CARDIAC REHAB | Facility: HOSPITAL | Age: 66
End: 2021-03-31
Attending: INTERNAL MEDICINE
Payer: COMMERCIAL

## 2021-03-31 PROCEDURE — 93798 PHYS/QHP OP CAR RHAB W/ECG: CPT

## 2021-04-01 ENCOUNTER — LAB ENCOUNTER (OUTPATIENT)
Dept: LAB | Age: 66
End: 2021-04-01
Attending: FAMILY MEDICINE
Payer: COMMERCIAL

## 2021-04-01 DIAGNOSIS — R79.89 ELEVATED LFTS: ICD-10-CM

## 2021-04-01 PROCEDURE — 36415 COLL VENOUS BLD VENIPUNCTURE: CPT

## 2021-04-01 PROCEDURE — 80076 HEPATIC FUNCTION PANEL: CPT

## 2021-04-02 ENCOUNTER — CARDPULM VISIT (OUTPATIENT)
Dept: CARDIAC REHAB | Facility: HOSPITAL | Age: 66
End: 2021-04-02
Attending: INTERNAL MEDICINE
Payer: COMMERCIAL

## 2021-04-02 PROCEDURE — 93798 PHYS/QHP OP CAR RHAB W/ECG: CPT

## 2021-04-05 ENCOUNTER — APPOINTMENT (OUTPATIENT)
Dept: CARDIAC REHAB | Facility: HOSPITAL | Age: 66
End: 2021-04-05
Attending: INTERNAL MEDICINE
Payer: COMMERCIAL

## 2021-04-07 ENCOUNTER — CARDPULM VISIT (OUTPATIENT)
Dept: CARDIAC REHAB | Facility: HOSPITAL | Age: 66
End: 2021-04-07
Attending: INTERNAL MEDICINE
Payer: COMMERCIAL

## 2021-04-07 PROCEDURE — 93798 PHYS/QHP OP CAR RHAB W/ECG: CPT

## 2021-04-09 ENCOUNTER — CARDPULM VISIT (OUTPATIENT)
Dept: CARDIAC REHAB | Facility: HOSPITAL | Age: 66
End: 2021-04-09
Attending: INTERNAL MEDICINE
Payer: COMMERCIAL

## 2021-04-09 PROCEDURE — 93798 PHYS/QHP OP CAR RHAB W/ECG: CPT

## 2021-04-12 ENCOUNTER — CARDPULM VISIT (OUTPATIENT)
Dept: CARDIAC REHAB | Facility: HOSPITAL | Age: 66
End: 2021-04-12
Attending: INTERNAL MEDICINE
Payer: COMMERCIAL

## 2021-04-12 PROCEDURE — 93798 PHYS/QHP OP CAR RHAB W/ECG: CPT

## 2021-04-14 ENCOUNTER — CARDPULM VISIT (OUTPATIENT)
Dept: CARDIAC REHAB | Facility: HOSPITAL | Age: 66
End: 2021-04-14
Attending: INTERNAL MEDICINE
Payer: COMMERCIAL

## 2021-04-14 PROCEDURE — 93798 PHYS/QHP OP CAR RHAB W/ECG: CPT

## 2021-04-16 ENCOUNTER — CARDPULM VISIT (OUTPATIENT)
Dept: CARDIAC REHAB | Facility: HOSPITAL | Age: 66
End: 2021-04-16
Attending: INTERNAL MEDICINE
Payer: COMMERCIAL

## 2021-04-16 PROCEDURE — 93798 PHYS/QHP OP CAR RHAB W/ECG: CPT

## 2021-04-19 ENCOUNTER — CARDPULM VISIT (OUTPATIENT)
Dept: CARDIAC REHAB | Facility: HOSPITAL | Age: 66
End: 2021-04-19
Attending: INTERNAL MEDICINE
Payer: COMMERCIAL

## 2021-04-19 PROCEDURE — 93798 PHYS/QHP OP CAR RHAB W/ECG: CPT

## 2021-04-21 ENCOUNTER — CARDPULM VISIT (OUTPATIENT)
Dept: CARDIAC REHAB | Facility: HOSPITAL | Age: 66
End: 2021-04-21
Attending: INTERNAL MEDICINE
Payer: COMMERCIAL

## 2021-04-21 PROCEDURE — 93798 PHYS/QHP OP CAR RHAB W/ECG: CPT

## 2021-04-23 ENCOUNTER — CARDPULM VISIT (OUTPATIENT)
Dept: CARDIAC REHAB | Facility: HOSPITAL | Age: 66
End: 2021-04-23
Attending: INTERNAL MEDICINE
Payer: COMMERCIAL

## 2021-04-23 PROCEDURE — 93798 PHYS/QHP OP CAR RHAB W/ECG: CPT

## 2021-04-28 ENCOUNTER — CARDPULM VISIT (OUTPATIENT)
Dept: CARDIAC REHAB | Facility: HOSPITAL | Age: 66
End: 2021-04-28
Attending: INTERNAL MEDICINE
Payer: COMMERCIAL

## 2021-04-28 PROCEDURE — 93798 PHYS/QHP OP CAR RHAB W/ECG: CPT

## 2021-04-30 ENCOUNTER — CARDPULM VISIT (OUTPATIENT)
Dept: CARDIAC REHAB | Facility: HOSPITAL | Age: 66
End: 2021-04-30
Attending: INTERNAL MEDICINE
Payer: COMMERCIAL

## 2021-04-30 PROCEDURE — 93798 PHYS/QHP OP CAR RHAB W/ECG: CPT

## 2021-05-03 ENCOUNTER — TELEPHONE (OUTPATIENT)
Dept: CARDIOLOGY | Age: 66
End: 2021-05-03

## 2021-05-03 ENCOUNTER — CARDPULM VISIT (OUTPATIENT)
Dept: CARDIAC REHAB | Facility: HOSPITAL | Age: 66
End: 2021-05-03
Attending: INTERNAL MEDICINE
Payer: COMMERCIAL

## 2021-05-03 PROCEDURE — 93798 PHYS/QHP OP CAR RHAB W/ECG: CPT

## 2021-05-05 ENCOUNTER — CARDPULM VISIT (OUTPATIENT)
Dept: CARDIAC REHAB | Facility: HOSPITAL | Age: 66
End: 2021-05-05
Attending: INTERNAL MEDICINE
Payer: COMMERCIAL

## 2021-05-05 PROCEDURE — 93798 PHYS/QHP OP CAR RHAB W/ECG: CPT

## 2021-05-07 ENCOUNTER — CARDPULM VISIT (OUTPATIENT)
Dept: CARDIAC REHAB | Facility: HOSPITAL | Age: 66
End: 2021-05-07
Attending: INTERNAL MEDICINE
Payer: COMMERCIAL

## 2021-05-07 PROCEDURE — 93798 PHYS/QHP OP CAR RHAB W/ECG: CPT

## 2021-05-10 ENCOUNTER — OFFICE VISIT (OUTPATIENT)
Dept: CARDIOLOGY | Age: 66
End: 2021-05-10

## 2021-05-10 VITALS
WEIGHT: 178 LBS | BODY MASS INDEX: 26.98 KG/M2 | SYSTOLIC BLOOD PRESSURE: 115 MMHG | DIASTOLIC BLOOD PRESSURE: 70 MMHG | HEIGHT: 68 IN | HEART RATE: 73 BPM

## 2021-05-10 DIAGNOSIS — I25.10 ATHEROSCLEROSIS OF NATIVE CORONARY ARTERY OF NATIVE HEART WITHOUT ANGINA PECTORIS: ICD-10-CM

## 2021-05-10 DIAGNOSIS — I25.10 CORONARY ARTERY DISEASE INVOLVING NATIVE CORONARY ARTERY OF NATIVE HEART WITHOUT ANGINA PECTORIS: ICD-10-CM

## 2021-05-10 DIAGNOSIS — I10 BENIGN ESSENTIAL HYPERTENSION: ICD-10-CM

## 2021-05-10 DIAGNOSIS — R07.2 PRECORDIAL PAIN: Primary | ICD-10-CM

## 2021-05-10 PROCEDURE — 3074F SYST BP LT 130 MM HG: CPT | Performed by: NURSE PRACTITIONER

## 2021-05-10 PROCEDURE — 99214 OFFICE O/P EST MOD 30 MIN: CPT | Performed by: NURSE PRACTITIONER

## 2021-05-10 PROCEDURE — 3078F DIAST BP <80 MM HG: CPT | Performed by: NURSE PRACTITIONER

## 2021-05-10 PROCEDURE — 93000 ELECTROCARDIOGRAM COMPLETE: CPT | Performed by: NURSE PRACTITIONER

## 2021-05-10 ASSESSMENT — ENCOUNTER SYMPTOMS
SHORTNESS OF BREATH: 0
DECREASED APPETITE: 0
NEUROLOGICAL NEGATIVE: 1
WEIGHT GAIN: 0
WEIGHT LOSS: 0
GASTROINTESTINAL NEGATIVE: 1
DIAPHORESIS: 0
SYNCOPE: 0

## 2021-05-10 ASSESSMENT — PATIENT HEALTH QUESTIONNAIRE - PHQ9
SUM OF ALL RESPONSES TO PHQ9 QUESTIONS 1 AND 2: 0
1. LITTLE INTEREST OR PLEASURE IN DOING THINGS: NOT AT ALL
2. FEELING DOWN, DEPRESSED OR HOPELESS: NOT AT ALL
SUM OF ALL RESPONSES TO PHQ9 QUESTIONS 1 AND 2: 0
CLINICAL INTERPRETATION OF PHQ2 SCORE: NO FURTHER SCREENING NEEDED
CLINICAL INTERPRETATION OF PHQ9 SCORE: NO FURTHER SCREENING NEEDED

## 2021-05-11 ENCOUNTER — ORDER TRANSCRIPTION (OUTPATIENT)
Dept: ADMINISTRATIVE | Facility: HOSPITAL | Age: 66
End: 2021-05-11

## 2021-05-11 ENCOUNTER — MED REC SCAN ONLY (OUTPATIENT)
Dept: FAMILY MEDICINE CLINIC | Facility: CLINIC | Age: 66
End: 2021-05-11

## 2021-05-11 DIAGNOSIS — Z11.59 ENCOUNTER FOR SCREENING FOR OTHER VIRAL DISEASES: ICD-10-CM

## 2021-05-11 DIAGNOSIS — Z01.818 PREOP EXAMINATION: Primary | ICD-10-CM

## 2021-05-14 ENCOUNTER — LAB ENCOUNTER (OUTPATIENT)
Dept: LAB | Age: 66
End: 2021-05-14
Attending: NURSE PRACTITIONER
Payer: COMMERCIAL

## 2021-05-14 DIAGNOSIS — Z01.818 PREOP EXAMINATION: ICD-10-CM

## 2021-05-14 DIAGNOSIS — Z11.59 ENCOUNTER FOR SCREENING FOR OTHER VIRAL DISEASES: ICD-10-CM

## 2021-05-14 LAB
SARS-COV-2 RNA SPEC QL NAA+PROBE: NOT DETECTED
SPECIMEN SOURCE: NORMAL

## 2021-05-17 ENCOUNTER — HOSPITAL ENCOUNTER (OUTPATIENT)
Dept: CV DIAGNOSTICS | Facility: HOSPITAL | Age: 66
Discharge: HOME | End: 2021-05-17
Attending: NURSE PRACTITIONER
Payer: COMMERCIAL

## 2021-05-17 DIAGNOSIS — R07.2 PRECORDIAL PAIN: ICD-10-CM

## 2021-05-17 DIAGNOSIS — I10 BENIGN ESSENTIAL HYPERTENSION: ICD-10-CM

## 2021-05-17 DIAGNOSIS — I25.10 CORONARY ARTERY DISEASE INVOLVING NATIVE CORONARY ARTERY OF NATIVE HEART WITHOUT ANGINA PECTORIS: ICD-10-CM

## 2021-05-17 PROCEDURE — 78452 HT MUSCLE IMAGE SPECT MULT: CPT | Performed by: NURSE PRACTITIONER

## 2021-05-17 PROCEDURE — 93018 CV STRESS TEST I&R ONLY: CPT | Performed by: NURSE PRACTITIONER

## 2021-05-17 PROCEDURE — 93017 CV STRESS TEST TRACING ONLY: CPT | Performed by: NURSE PRACTITIONER

## 2021-05-20 RX ORDER — ROSUVASTATIN CALCIUM 10 MG/1
TABLET, COATED ORAL
Qty: 90 TABLET | Refills: 3 | Status: SHIPPED | OUTPATIENT
Start: 2021-05-20

## 2021-05-24 ENCOUNTER — TELEPHONE (OUTPATIENT)
Dept: PHYSICAL THERAPY | Facility: HOSPITAL | Age: 66
End: 2021-05-24

## 2021-05-27 ENCOUNTER — OFFICE VISIT (OUTPATIENT)
Dept: PHYSICAL THERAPY | Age: 66
End: 2021-05-27
Attending: FAMILY MEDICINE
Payer: COMMERCIAL

## 2021-05-27 PROCEDURE — 97110 THERAPEUTIC EXERCISES: CPT

## 2021-05-27 PROCEDURE — 97161 PT EVAL LOW COMPLEX 20 MIN: CPT

## 2021-05-27 NOTE — PROGRESS NOTES
ELBOW AND HAND EVALUATION:   Referring Physician: Dr. Adam Victor  Diagnosis: Lateral epicondylosis L>R     Date of Service: 5/27/2021     PATIENT Uvaldo London is a 73year old male who presents to therapy today with complaints of elbow pa protruding more. He states sometimes he will feel the pain down into the hands depending on what he just did. He does not feel this into fingers or as N/T sensations. Work and recreational activities  He just finished cardiac rehab 3 weeks ago. [facility]      Ph discussed evaluation findings, pathology, POC and HEP. Pt voiced understanding and performs HEP correctly without reported pain. Skilled Physical Therapy is medically necessary to address the above impairments and reach functional goals.      Precautions: provided on exam findings, treatment diagnosis, treatment plan, expectations, and prognosis.  Pt was also provided recommendations for activity modifications, possible soreness after evaluation, modalities as needed [ice/heat], postural corrections, ergonom Modalities to include: Electrical stimulation (unattended) and Ultrasound    Education or treatment limitation: None  Rehab Potential:good    FOTO: emailed 5/27/2021 /100    Patient/Family/Caregiver was advised of these findings, precautions, and treatment

## 2021-06-02 ENCOUNTER — OFFICE VISIT (OUTPATIENT)
Dept: PHYSICAL THERAPY | Age: 66
End: 2021-06-02
Attending: FAMILY MEDICINE
Payer: COMMERCIAL

## 2021-06-02 PROCEDURE — 97110 THERAPEUTIC EXERCISES: CPT

## 2021-06-02 PROCEDURE — 97530 THERAPEUTIC ACTIVITIES: CPT

## 2021-06-02 PROCEDURE — 97140 MANUAL THERAPY 1/> REGIONS: CPT

## 2021-06-02 NOTE — PROGRESS NOTES
Dx: Lateral epicondylosis L>R             Insurance (Authorized # of Visits):  [facility] no auth - 20 visits            Authorizing Physician: Dr. Rosetta Mendoza  Next MD visit: none scheduled  Fall Risk: standard         Precautions: n/a             Subjective: Pt st extensor mm flexibility to be able to drive without pain   · Pt will be independent and compliant with comprehensive HEP to maintain progress achieved in PT    Plan: Continue per plan of care.    Date: 6/2/2021  TX#: 2/8 Date:                 TX#: 3/ Date: 10 reps - 10 hold  Seated Wrist Flexion Active Stretch Pronated with Elbow Straight - 1 x daily - 7 x weekly - 1 sets - 10 reps - 1 sec hold  Charges: there ex: 2; manual; 1 there act: 1       Total Timed Treatment: 55 min  Total Treatment Time: 58 min

## 2021-06-07 ENCOUNTER — APPOINTMENT (OUTPATIENT)
Dept: PHYSICAL THERAPY | Age: 66
End: 2021-06-07
Attending: FAMILY MEDICINE
Payer: COMMERCIAL

## 2021-06-09 ENCOUNTER — OFFICE VISIT (OUTPATIENT)
Dept: PHYSICAL THERAPY | Age: 66
End: 2021-06-09
Attending: FAMILY MEDICINE
Payer: COMMERCIAL

## 2021-06-09 PROCEDURE — 97110 THERAPEUTIC EXERCISES: CPT

## 2021-06-09 PROCEDURE — 97035 APP MDLTY 1+ULTRASOUND EA 15: CPT

## 2021-06-09 PROCEDURE — 97140 MANUAL THERAPY 1/> REGIONS: CPT

## 2021-06-09 NOTE — PROGRESS NOTES
Dx: Lateral epicondylosis L>R             Insurance (Authorized # of Visits):  [facility] no auth - 20 visits            Authorizing Physician: Dr. Neymar Thomas  Next MD visit: none scheduled  Fall Risk: standard         Precautions: n/a             Subjective: Repor Supination/pronation mid range pain free 1# 10 reps   Passive wrist flexion stretch elbow flexion 10 sec hold 10 reps; progressively more elbow extension- unlocked but -10 deg dynamic wrist flexion 10 reps      ther ex:   Wrist ext 1# 2x10 R/L  Wrist ext

## 2021-06-10 ENCOUNTER — APPOINTMENT (OUTPATIENT)
Dept: PHYSICAL THERAPY | Age: 66
End: 2021-06-10
Payer: COMMERCIAL

## 2021-06-15 ENCOUNTER — OFFICE VISIT (OUTPATIENT)
Dept: PHYSICAL THERAPY | Age: 66
End: 2021-06-15
Attending: FAMILY MEDICINE
Payer: COMMERCIAL

## 2021-06-15 PROCEDURE — 97110 THERAPEUTIC EXERCISES: CPT

## 2021-06-15 PROCEDURE — 97140 MANUAL THERAPY 1/> REGIONS: CPT

## 2021-06-15 PROCEDURE — 97035 APP MDLTY 1+ULTRASOUND EA 15: CPT

## 2021-06-15 NOTE — PROGRESS NOTES
Dx: Lateral epicondylosis L>R             Insurance (Authorized # of Visits):  [facility] no auth - 20 visits            Authorizing Physician: Dr. Cosmo Noonan  Next MD visit: none scheduled  Fall Risk: standard         Precautions: n/a             Subjective: Repor x 2 sets - avoiding terminal range for *pain   Seated wrist extension 1# 10 reps x 2 sets - avoiding terminal range for *pain   Supination/pronation mid range pain free 1# 10 reps   Passive wrist flexion stretch elbow flexion 10 sec hold 10 reps; progressi Pronated with Elbow Straight - 1 x daily - 7 x weekly - 1 sets - 10 reps - 1 sec hold  Charges: there ex: 1; manual; 1 US 1      Total Timed Treatment: 45 min  Total Treatment Time: 55 min

## 2021-06-21 ENCOUNTER — TELEPHONE (OUTPATIENT)
Dept: PHYSICAL THERAPY | Facility: HOSPITAL | Age: 66
End: 2021-06-21

## 2021-06-21 ENCOUNTER — APPOINTMENT (OUTPATIENT)
Dept: PHYSICAL THERAPY | Age: 66
End: 2021-06-21
Attending: FAMILY MEDICINE
Payer: COMMERCIAL

## 2021-06-24 ENCOUNTER — OFFICE VISIT (OUTPATIENT)
Dept: PHYSICAL THERAPY | Age: 66
End: 2021-06-24
Attending: FAMILY MEDICINE
Payer: COMMERCIAL

## 2021-06-24 PROCEDURE — 97035 APP MDLTY 1+ULTRASOUND EA 15: CPT

## 2021-06-24 PROCEDURE — 97110 THERAPEUTIC EXERCISES: CPT

## 2021-06-24 PROCEDURE — 97140 MANUAL THERAPY 1/> REGIONS: CPT

## 2021-06-24 NOTE — PROGRESS NOTES
Dx: Lateral epicondylosis L>R             Insurance (Authorized # of Visits):  [facility] no auth - 20 visits            Authorizing Physician: Dr. Claire Wellington  Next MD visit: none scheduled  Fall Risk: standard         Precautions: n/a             Subjective: Repor flexion stretch elbow flexion 10 sec hold 10 reps; progressively more elbow extension- unlocked but -10 deg dynamic wrist flexion 10 reps      ther ex:   Wrist ext 1# 2x10 R/L  Wrist ext with ulnar dev 1# 2x10 R/L   stretch 3x30 sec R/L  Wrist flexor st Flexion Active Stretch Pronated with Elbow Straight - 1 x daily - 7 x weekly - 1 sets - 10 reps - 1 sec hold  Charges: there ex: 1; manual; 1 US 1      Total Timed Treatment: 45 min  Total Treatment Time: 55 min

## 2021-06-28 ENCOUNTER — OFFICE VISIT (OUTPATIENT)
Dept: PHYSICAL THERAPY | Age: 66
End: 2021-06-28
Attending: FAMILY MEDICINE
Payer: COMMERCIAL

## 2021-06-28 PROCEDURE — 97140 MANUAL THERAPY 1/> REGIONS: CPT

## 2021-06-28 PROCEDURE — 97035 APP MDLTY 1+ULTRASOUND EA 15: CPT

## 2021-06-28 PROCEDURE — 97110 THERAPEUTIC EXERCISES: CPT

## 2021-06-28 NOTE — PROGRESS NOTES
Dx: Lateral epicondylosis L>R             Insurance (Authorized # of Visits):  [facility] no auth - 20 visits            Authorizing Physician: Dr. Junior Zaragoza MD visit: none scheduled  Fall Risk: standard         Precautions: n/a             Subjective: Repor nerve glides x10 ea   There ex:   Semi inclined tricep extension overhead 1# 10 reps to -10 deg elbow extension x 2 sets   Seated wrist flexion 1# 10 reps x 2 sets - avoiding terminal range for *pain   Seated wrist extension 1# 10 reps x 2 sets - avoiding Priyanka    Program Notes  Ice to the outside of the elbow 10 min on the L - after exercises and at the end of the day minimally     All to pain tolerance- please modify and keep as comfortable as possible.          Exercises  Seated Wrist Extension with D

## 2021-06-30 ENCOUNTER — OFFICE VISIT (OUTPATIENT)
Dept: PHYSICAL THERAPY | Age: 66
End: 2021-06-30
Attending: FAMILY MEDICINE
Payer: COMMERCIAL

## 2021-06-30 PROCEDURE — 97110 THERAPEUTIC EXERCISES: CPT

## 2021-06-30 PROCEDURE — 97140 MANUAL THERAPY 1/> REGIONS: CPT

## 2021-06-30 PROCEDURE — 97035 APP MDLTY 1+ULTRASOUND EA 15: CPT

## 2021-06-30 NOTE — PROGRESS NOTES
Dx: Lateral epicondylosis L>R             Insurance (Authorized # of Visits):  [facility] no auth - 20 visits            Authorizing Physician: Dr. aSlma Rincon  Next MD visit: none scheduled  Fall Risk: standard         Precautions: n/a             Subjective: Repor R/L    Wrist ext with ulnar dev 1# 2x10 R/L   stretch 3x30 sec R/L Ther ex:   Eccentric Wrist ext 1# 2x10 R/L    Wrist ext with ulnar dev 1# 2x10 R/L   stretch 3x30 sec R/L Ther ex:   Wall press ups x10  Wall presses into wall x10  Eccentric Wrist ex

## 2021-07-07 ENCOUNTER — OFFICE VISIT (OUTPATIENT)
Dept: PHYSICAL THERAPY | Age: 66
End: 2021-07-07
Attending: FAMILY MEDICINE
Payer: COMMERCIAL

## 2021-07-07 PROCEDURE — 97140 MANUAL THERAPY 1/> REGIONS: CPT

## 2021-07-07 PROCEDURE — 97110 THERAPEUTIC EXERCISES: CPT

## 2021-07-07 PROCEDURE — 97035 APP MDLTY 1+ULTRASOUND EA 15: CPT

## 2021-07-07 NOTE — PROGRESS NOTES
Dx: Lateral epicondylosis L>R             Insurance (Authorized # of Visits):  [facility] no auth - 20 visits            Authorizing Physician: Dr. Linn Sites  Next MD visit: none scheduled  Fall Risk: standard         Precautions: n/a             Discharge Summary services furnished under this plan of treatment and while under my care.     X___________________________________________________ Date____________________    Certification From: 9/4/6409  To:10/5/2021        Date: 6/15/2021             TX#: 4/8 Date: 6/24/2 06/02/2021  Prepared by: Meredith Areas    Program Notes  Ice to the outside of the elbow 10 min on the L - after exercises and at the end of the day minimally     All to pain tolerance- please modify and keep as comfortable as possible.          Exercises

## 2021-07-20 ENCOUNTER — OFFICE VISIT (OUTPATIENT)
Dept: FAMILY MEDICINE CLINIC | Facility: CLINIC | Age: 66
End: 2021-07-20
Payer: COMMERCIAL

## 2021-07-20 VITALS
HEIGHT: 67.52 IN | HEART RATE: 84 BPM | SYSTOLIC BLOOD PRESSURE: 112 MMHG | RESPIRATION RATE: 16 BRPM | WEIGHT: 175.25 LBS | BODY MASS INDEX: 26.87 KG/M2 | OXYGEN SATURATION: 96 % | DIASTOLIC BLOOD PRESSURE: 68 MMHG

## 2021-07-20 DIAGNOSIS — Z13.89 SCREENING FOR GENITOURINARY CONDITION: ICD-10-CM

## 2021-07-20 DIAGNOSIS — M25.522 PAIN OF BOTH ELBOWS: ICD-10-CM

## 2021-07-20 DIAGNOSIS — Z00.00 BLOOD TESTS FOR ROUTINE GENERAL PHYSICAL EXAMINATION: ICD-10-CM

## 2021-07-20 DIAGNOSIS — Z00.00 ROUTINE GENERAL MEDICAL EXAMINATION AT A HEALTH CARE FACILITY: Primary | ICD-10-CM

## 2021-07-20 DIAGNOSIS — M25.521 PAIN OF BOTH ELBOWS: ICD-10-CM

## 2021-07-20 PROBLEM — I25.10 CORONARY ARTERY DISEASE INVOLVING NATIVE CORONARY ARTERY OF NATIVE HEART WITHOUT ANGINA PECTORIS: Status: ACTIVE | Noted: 2020-11-23

## 2021-07-20 PROBLEM — Z95.5 PRESENCE OF DRUG COATED STENT IN LAD CORONARY ARTERY: Status: ACTIVE | Noted: 2020-11-23

## 2021-07-20 PROCEDURE — 3008F BODY MASS INDEX DOCD: CPT | Performed by: FAMILY MEDICINE

## 2021-07-20 PROCEDURE — 99397 PER PM REEVAL EST PAT 65+ YR: CPT | Performed by: FAMILY MEDICINE

## 2021-07-20 PROCEDURE — 3074F SYST BP LT 130 MM HG: CPT | Performed by: FAMILY MEDICINE

## 2021-07-20 PROCEDURE — 3078F DIAST BP <80 MM HG: CPT | Performed by: FAMILY MEDICINE

## 2021-07-20 NOTE — PATIENT INSTRUCTIONS
Check on insurance coverage for Shingrix. If covered, then ask your insurance if you should do it at the [address] [place] [address] office or pharmacy. We do currently have it available in our office. Forward Covid vaccine date. Recommend Pneumovax.

## 2021-08-03 ENCOUNTER — APPOINTMENT (OUTPATIENT)
Dept: GENERAL RADIOLOGY | Age: 66
End: 2021-08-03
Attending: NURSE PRACTITIONER
Payer: COMMERCIAL

## 2021-08-03 ENCOUNTER — HOSPITAL ENCOUNTER (OUTPATIENT)
Age: 66
Discharge: HOME | End: 2021-08-03
Payer: COMMERCIAL

## 2021-08-03 VITALS
BODY MASS INDEX: 26.52 KG/M2 | OXYGEN SATURATION: 97 % | WEIGHT: 175 LBS | TEMPERATURE: 98 F | RESPIRATION RATE: 18 BRPM | HEART RATE: 75 BPM | DIASTOLIC BLOOD PRESSURE: 93 MMHG | HEIGHT: 68 IN | SYSTOLIC BLOOD PRESSURE: 157 MMHG

## 2021-08-03 DIAGNOSIS — B34.9 VIRAL SYNDROME: Primary | ICD-10-CM

## 2021-08-03 LAB — S PYO AG THROAT QL: NEGATIVE

## 2021-08-03 PROCEDURE — 87880 STREP A ASSAY W/OPTIC: CPT

## 2021-08-03 PROCEDURE — 99203 OFFICE O/P NEW LOW 30 MIN: CPT

## 2021-08-03 PROCEDURE — 99213 OFFICE O/P EST LOW 20 MIN: CPT

## 2021-08-03 PROCEDURE — 71046 X-RAY EXAM CHEST 2 VIEWS: CPT | Performed by: NURSE PRACTITIONER

## 2021-08-03 NOTE — ED PROVIDER NOTES
Patient Seen in: [facility]      History   Patient presents with:  Covid-19 Test    Stated Complaint: covid testing / sorethroat and dry cough x 3 days    HPI/Subjective:   HPI  Patient presents to the urgent care with report of having de Use      Smoking status: Never Smoker      Smokeless tobacco: Never Used    Vaping Use      Vaping Use: Never used    Alcohol use:  Yes      Alcohol/week: 1.0 - 2.0 standard drinks      Types: 1 - 2 Standard drinks or equivalent per week      Comment: dominic appreciated to palpation, no tenderness with palpation, full range of motion respiratory: No evidence of labored breathing, lung sounds clear but diminished at base bilaterally, no respiratory distress, no wheezing, rales, or rhonchi.   Cardiovascular: RR; 10:12 AM     Finalized by (CST): Noemi Seals MD on 8/03/2021 at 10:13 AM     With negative chest x-ray results, negative strep test, patient alerted to the fact that he needs to check his MyChart in the next couple of days for his Covid test results, an

## 2021-08-05 LAB — SARS-COV-2 RNA RESP QL NAA+PROBE: NOT DETECTED

## 2021-08-31 ENCOUNTER — OFFICE VISIT (OUTPATIENT)
Dept: ORTHOPEDICS CLINIC | Facility: CLINIC | Age: 66
End: 2021-08-31
Payer: COMMERCIAL

## 2021-08-31 VITALS — HEART RATE: 69 BPM | OXYGEN SATURATION: 100 %

## 2021-08-31 DIAGNOSIS — M77.12 LATERAL EPICONDYLITIS OF BOTH ELBOWS: Primary | ICD-10-CM

## 2021-08-31 DIAGNOSIS — M77.11 LATERAL EPICONDYLITIS OF BOTH ELBOWS: Primary | ICD-10-CM

## 2021-08-31 PROCEDURE — 20551 NJX 1 TENDON ORIGIN/INSJ: CPT | Performed by: ORTHOPAEDIC SURGERY

## 2021-08-31 PROCEDURE — 99203 OFFICE O/P NEW LOW 30 MIN: CPT | Performed by: ORTHOPAEDIC SURGERY

## 2021-08-31 RX ORDER — TRIAMCINOLONE ACETONIDE 40 MG/ML
40 INJECTION, SUSPENSION INTRA-ARTICULAR; INTRAMUSCULAR ONCE
Status: COMPLETED | OUTPATIENT
Start: 2021-08-31 | End: 2021-08-31

## 2021-08-31 RX ADMIN — TRIAMCINOLONE ACETONIDE 40 MG: 40 INJECTION, SUSPENSION INTRA-ARTICULAR; INTRAMUSCULAR at 09:45:00

## 2021-08-31 NOTE — H&P
Clinic Note [facility]     Assessment/Plan:  73year old male    1. Bilateral tennis elbow–patient has failed bracing and recent physical therapy that ended about 2 months ago.   He is interested in a corticosteroid injection which I think is a reaso Sprain of thoracic region    • Unspecified disorder of skin and subcutaneous tissue      Past Surgical History:   Procedure Laterality Date   • COLONOSCOPY  03/13/2009   • CORONARY STENT PLACEMENT  11/09/2020    CARDIAC CATHETERIZATION/PERCUTANEOUS CORONAR Hand, Wrist, & Elbow Surgery  [facility]  [facility] [address] [facility], [place] [address] org  [email or web address]  t: [phone]  f: [phone]

## 2021-09-12 RX ORDER — LISINOPRIL 5 MG/1
TABLET ORAL
Qty: 90 TABLET | Refills: 0 | Status: SHIPPED | OUTPATIENT
Start: 2021-09-12 | End: 2021-11-22

## 2021-09-16 ENCOUNTER — MED REC SCAN ONLY (OUTPATIENT)
Dept: FAMILY MEDICINE CLINIC | Facility: CLINIC | Age: 66
End: 2021-09-16

## 2021-10-27 ENCOUNTER — TELEPHONE (OUTPATIENT)
Dept: FAMILY MEDICINE CLINIC | Facility: CLINIC | Age: 66
End: 2021-10-27

## 2021-10-27 NOTE — TELEPHONE ENCOUNTER
Pt reported below \"dr black last wk, did a scope, said everything looked fine. \" will call pt and offer appt.

## 2021-10-27 NOTE — TELEPHONE ENCOUNTER
Pt having recurring hoarsness and raspiness. Pt indicated that he sometimes has shortness of breath when talking.     Pt has an appt with pulmonologist but not until Dec.    Please advise

## 2021-10-27 NOTE — TELEPHONE ENCOUNTER
Could be vocal cord dysfunction so I think pulmonary is a good plan. Given that the pulmonary appointment isn't until December, we should also think about doing a chest CT to ensure no problems there that would be causing.   Did ENT do a laryngoscope and venus

## 2021-10-27 NOTE — TELEPHONE ENCOUNTER
Call back to pr-advised of [employer] comments/recommendations. Offered appt Friday 10/29. Patient voices understanding/agrees with plan/no further questions.  Scheduled appt for noon 10/29/21

## 2021-10-27 NOTE — TELEPHONE ENCOUNTER
Call to pt-reports onset \"at least a couple months ago of intermittent but almost daily hoarseness/raspiness and occ feeling of gasping for air between words but no shortness of breath, chest pain or tightness.  Reports some days are completely fine-\"feel

## 2021-10-29 ENCOUNTER — OFFICE VISIT (OUTPATIENT)
Dept: FAMILY MEDICINE CLINIC | Facility: CLINIC | Age: 66
End: 2021-10-29
Payer: COMMERCIAL

## 2021-10-29 VITALS
HEART RATE: 80 BPM | WEIGHT: 171 LBS | BODY MASS INDEX: 25.91 KG/M2 | SYSTOLIC BLOOD PRESSURE: 136 MMHG | RESPIRATION RATE: 16 BRPM | HEIGHT: 68 IN | TEMPERATURE: 98 F | DIASTOLIC BLOOD PRESSURE: 80 MMHG

## 2021-10-29 DIAGNOSIS — R06.02 SHORTNESS OF BREATH: ICD-10-CM

## 2021-10-29 DIAGNOSIS — J04.0 LARYNGITIS: ICD-10-CM

## 2021-10-29 DIAGNOSIS — R05.9 COUGH: Primary | ICD-10-CM

## 2021-10-29 DIAGNOSIS — R63.4 WEIGHT LOSS: ICD-10-CM

## 2021-10-29 PROCEDURE — 3075F SYST BP GE 130 - 139MM HG: CPT | Performed by: FAMILY MEDICINE

## 2021-10-29 PROCEDURE — 99214 OFFICE O/P EST MOD 30 MIN: CPT | Performed by: FAMILY MEDICINE

## 2021-10-29 PROCEDURE — 3008F BODY MASS INDEX DOCD: CPT | Performed by: FAMILY MEDICINE

## 2021-10-29 PROCEDURE — 3079F DIAST BP 80-89 MM HG: CPT | Performed by: FAMILY MEDICINE

## 2021-10-29 NOTE — PROGRESS NOTES
Renee Shah is a 73year old male. CHIEF COMPLAINT   Laryngitis, cough, short of breath with talking, weight loss  HPI:   Last couple months short of breath when talking.    did laryngoscope - all ok  Appointment with pulmonology on 12/2 HEALTH: as above    EXAM:   /80   Pulse 80   Temp 98 °F (36.7 °C)   Resp 16   Ht 5' 8\" (1.727 m)   Wt 171 lb (77.6 kg)   BMI 26.00 kg/m²   GENERAL: well developed, well nourished,in no apparent distress  SKIN: no rashes,no suspicious lesions  THROAT

## 2021-11-19 ENCOUNTER — HOSPITAL ENCOUNTER (OUTPATIENT)
Age: 66
Discharge: HOME | End: 2021-11-19
Payer: COMMERCIAL

## 2021-11-19 VITALS
SYSTOLIC BLOOD PRESSURE: 157 MMHG | HEART RATE: 81 BPM | RESPIRATION RATE: 19 BRPM | BODY MASS INDEX: 26.37 KG/M2 | HEIGHT: 68 IN | DIASTOLIC BLOOD PRESSURE: 94 MMHG | WEIGHT: 174 LBS | OXYGEN SATURATION: 97 % | TEMPERATURE: 99 F

## 2021-11-19 DIAGNOSIS — J06.9 UPPER RESPIRATORY TRACT INFECTION, UNSPECIFIED TYPE: Primary | ICD-10-CM

## 2021-11-19 DIAGNOSIS — Z20.822 LAB TEST NEGATIVE FOR COVID-19 VIRUS: ICD-10-CM

## 2021-11-19 PROCEDURE — 99213 OFFICE O/P EST LOW 20 MIN: CPT

## 2021-11-19 PROCEDURE — 99212 OFFICE O/P EST SF 10 MIN: CPT

## 2021-11-19 NOTE — ED PROVIDER NOTES
Patient Seen in: [facility]      History   Patient presents with:  Runny Nose    Stated Complaint: runny nose    Subjective:   71-year-old male, requesting a Covid PCR. States he developed runny nose and slight congestion 2 days ago.   St Neurological: Negative. All other systems reviewed and are negative. Positive for stated complaint: runny nose  Other systems are as noted in HPI. Constitutional and vital signs reviewed.       All other systems reviewed and negative except as no Behavior normal.         Thought Content:  Thought content normal.             ED Course     Labs Reviewed   RAPID SARS-COV-2 BY PCR - Normal                   MDM    Nontoxic 18-year-old male, in no acute distress started developing what he calls mild nasa

## 2021-11-19 NOTE — ED INITIAL ASSESSMENT (HPI)
Pt began 2 days ago with a runny nose, and has a hoarse voice now with sinus pressure , and wants a covid test

## 2021-11-22 RX ORDER — LISINOPRIL 5 MG/1
TABLET ORAL
Qty: 90 TABLET | Refills: 0 | Status: SHIPPED | OUTPATIENT
Start: 2021-11-22

## 2022-01-24 ENCOUNTER — PATIENT MESSAGE (OUTPATIENT)
Dept: FAMILY MEDICINE CLINIC | Facility: CLINIC | Age: 67
End: 2022-01-24

## 2022-01-24 DIAGNOSIS — R19.4 CHANGE IN BOWEL HABITS: Primary | ICD-10-CM

## 2022-01-24 NOTE — TELEPHONE ENCOUNTER
From: Betty Saleh  To: Juani Prabhakar PA-C  Sent: 1/24/2022 1:23 PM CST  Subject: Possible tenesmus? ? Bj Amaya,  For the last month or so I've had the recurring feeling of needing to poop throughout the day.  The last few days have been

## 2022-02-10 ENCOUNTER — OFFICE VISIT (OUTPATIENT)
Dept: SURGERY | Facility: CLINIC | Age: 67
End: 2022-02-10
Payer: MEDICARE

## 2022-02-10 VITALS
WEIGHT: 172 LBS | TEMPERATURE: 97 F | DIASTOLIC BLOOD PRESSURE: 92 MMHG | SYSTOLIC BLOOD PRESSURE: 144 MMHG | HEIGHT: 69 IN | HEART RATE: 80 BPM | BODY MASS INDEX: 25.48 KG/M2

## 2022-02-10 DIAGNOSIS — Z01.818 PRE-OP TESTING: ICD-10-CM

## 2022-02-10 DIAGNOSIS — R19.8 TENESMUS (RECTAL): ICD-10-CM

## 2022-02-10 DIAGNOSIS — R19.4 CHANGE IN BOWEL HABITS: Primary | ICD-10-CM

## 2022-02-10 DIAGNOSIS — I77.1 TORTUOUS AORTA (HCC): ICD-10-CM

## 2022-02-10 DIAGNOSIS — K64.2 GRADE III INTERNAL HEMORRHOIDS: ICD-10-CM

## 2022-02-10 DIAGNOSIS — K64.4 EXTERNAL HEMORRHOID: ICD-10-CM

## 2022-02-10 PROCEDURE — 46600 DIAGNOSTIC ANOSCOPY SPX: CPT | Performed by: COLON & RECTAL SURGERY

## 2022-02-10 PROCEDURE — 99204 OFFICE O/P NEW MOD 45 MIN: CPT | Performed by: COLON & RECTAL SURGERY

## 2022-02-10 RX ORDER — POLYETHYLENE GLYCOL 3350, SODIUM CHLORIDE, SODIUM BICARBONATE, POTASSIUM CHLORIDE 420; 11.2; 5.72; 1.48 G/4L; G/4L; G/4L; G/4L
POWDER, FOR SOLUTION ORAL
Qty: 1 EACH | Refills: 0 | Status: SHIPPED | OUTPATIENT
Start: 2022-02-10

## 2022-02-21 ENCOUNTER — OFFICE VISIT (OUTPATIENT)
Dept: ORTHOPEDICS CLINIC | Facility: CLINIC | Age: 67
End: 2022-02-21
Payer: MEDICARE

## 2022-02-21 ENCOUNTER — HOSPITAL ENCOUNTER (OUTPATIENT)
Dept: GENERAL RADIOLOGY | Age: 67
Discharge: HOME | End: 2022-02-21
Attending: ORTHOPAEDIC SURGERY
Payer: MEDICARE

## 2022-02-21 VITALS — BODY MASS INDEX: 26.22 KG/M2 | HEIGHT: 68 IN | WEIGHT: 173 LBS

## 2022-02-21 DIAGNOSIS — G89.29 CHRONIC MIDLINE LOW BACK PAIN WITH SCIATICA, SCIATICA LATERALITY UNSPECIFIED: ICD-10-CM

## 2022-02-21 DIAGNOSIS — M54.2 NECK PAIN: ICD-10-CM

## 2022-02-21 DIAGNOSIS — M77.11 LATERAL EPICONDYLITIS OF BOTH ELBOWS: Primary | ICD-10-CM

## 2022-02-21 DIAGNOSIS — M54.40 CHRONIC MIDLINE LOW BACK PAIN WITH SCIATICA, SCIATICA LATERALITY UNSPECIFIED: ICD-10-CM

## 2022-02-21 DIAGNOSIS — M77.12 LATERAL EPICONDYLITIS OF BOTH ELBOWS: Primary | ICD-10-CM

## 2022-02-21 DIAGNOSIS — M77.02 MEDIAL EPICONDYLITIS OF LEFT ELBOW: ICD-10-CM

## 2022-02-21 PROCEDURE — 20551 NJX 1 TENDON ORIGIN/INSJ: CPT | Performed by: ORTHOPAEDIC SURGERY

## 2022-02-21 PROCEDURE — 72110 X-RAY EXAM L-2 SPINE 4/>VWS: CPT | Performed by: ORTHOPAEDIC SURGERY

## 2022-02-21 PROCEDURE — 72052 X-RAY EXAM NECK SPINE 6/>VWS: CPT | Performed by: ORTHOPAEDIC SURGERY

## 2022-02-21 PROCEDURE — 99213 OFFICE O/P EST LOW 20 MIN: CPT | Performed by: ORTHOPAEDIC SURGERY

## 2022-02-21 RX ORDER — BETAMETHASONE SODIUM PHOSPHATE AND BETAMETHASONE ACETATE 3; 3 MG/ML; MG/ML
6 INJECTION, SUSPENSION INTRA-ARTICULAR; INTRALESIONAL; INTRAMUSCULAR; SOFT TISSUE ONCE
Status: COMPLETED | OUTPATIENT
Start: 2022-02-21 | End: 2022-02-21

## 2022-02-21 RX ORDER — METHYLPREDNISOLONE 4 MG/1
TABLET ORAL
Qty: 1 EACH | Refills: 0 | Status: SHIPPED | OUTPATIENT
Start: 2022-02-21

## 2022-02-21 RX ADMIN — BETAMETHASONE SODIUM PHOSPHATE AND BETAMETHASONE ACETATE 6 MG: 3; 3 INJECTION, SUSPENSION INTRA-ARTICULAR; INTRALESIONAL; INTRAMUSCULAR; SOFT TISSUE at 10:23:00

## 2022-02-21 NOTE — PROCEDURES
Written consent was obtained. Skin was prepped with ChloraPrep. 1 mL of 6 mg of betamethasone and 1 mL of 1% lidocaine was injected into the area of ECRB of origin. His same mixture was then injected into the medial aspect of the left elbow at the medial epicondyle. Patient tolerated both procedures. No complications were encountered. Band-Aid was applied.

## 2022-02-28 RX ORDER — LISINOPRIL 5 MG/1
TABLET ORAL
Qty: 90 TABLET | Refills: 0 | Status: SHIPPED | OUTPATIENT
Start: 2022-02-28 | End: 2022-04-04

## 2022-02-28 NOTE — TELEPHONE ENCOUNTER
LOV: 10/29/21 (acute)  Last Refill: 11/22/21, #90, 0 RF   Next OV: N/A    Protocol passed. [facility] sent to schedule appt.

## 2022-03-22 ENCOUNTER — TELEPHONE (OUTPATIENT)
Dept: SURGERY | Facility: CLINIC | Age: 67
End: 2022-03-22

## 2022-03-28 ENCOUNTER — ANESTHESIA EVENT (OUTPATIENT)
Dept: ENDOSCOPY | Facility: HOSPITAL | Age: 67
End: 2022-03-28
Payer: MEDICARE

## 2022-03-29 ENCOUNTER — ANESTHESIA (OUTPATIENT)
Dept: ENDOSCOPY | Facility: HOSPITAL | Age: 67
End: 2022-03-29
Payer: MEDICARE

## 2022-03-29 ENCOUNTER — HOSPITAL ENCOUNTER (OUTPATIENT)
Facility: HOSPITAL | Age: 67
Setting detail: HOSPITAL OUTPATIENT SURGERY
Discharge: HOME | End: 2022-03-29
Attending: COLON & RECTAL SURGERY | Admitting: COLON & RECTAL SURGERY
Payer: MEDICARE

## 2022-03-29 VITALS
HEIGHT: 68 IN | OXYGEN SATURATION: 94 % | DIASTOLIC BLOOD PRESSURE: 65 MMHG | BODY MASS INDEX: 25.76 KG/M2 | HEART RATE: 61 BPM | TEMPERATURE: 98 F | RESPIRATION RATE: 18 BRPM | SYSTOLIC BLOOD PRESSURE: 94 MMHG | WEIGHT: 170 LBS

## 2022-03-29 DIAGNOSIS — R19.4 CHANGE IN BOWEL HABITS: ICD-10-CM

## 2022-03-29 PROCEDURE — 0DBL8ZX EXCISION OF TRANSVERSE COLON, VIA NATURAL OR ARTIFICIAL OPENING ENDOSCOPIC, DIAGNOSTIC: ICD-10-PCS | Performed by: COLON & RECTAL SURGERY

## 2022-03-29 PROCEDURE — 88305 TISSUE EXAM BY PATHOLOGIST: CPT | Performed by: COLON & RECTAL SURGERY

## 2022-03-29 RX ORDER — SODIUM CHLORIDE, SODIUM LACTATE, POTASSIUM CHLORIDE, CALCIUM CHLORIDE 600; 310; 30; 20 MG/100ML; MG/100ML; MG/100ML; MG/100ML
INJECTION, SOLUTION INTRAVENOUS CONTINUOUS
Status: DISCONTINUED | OUTPATIENT
Start: 2022-03-29 | End: 2022-03-29

## 2022-03-29 RX ORDER — NALOXONE HYDROCHLORIDE 0.4 MG/ML
80 INJECTION, SOLUTION INTRAMUSCULAR; INTRAVENOUS; SUBCUTANEOUS AS NEEDED
Status: DISCONTINUED | OUTPATIENT
Start: 2022-03-29 | End: 2022-03-29

## 2022-03-29 RX ORDER — LIDOCAINE HYDROCHLORIDE 10 MG/ML
INJECTION, SOLUTION EPIDURAL; INFILTRATION; INTRACAUDAL; PERINEURAL AS NEEDED
Status: DISCONTINUED | OUTPATIENT
Start: 2022-03-29 | End: 2022-03-29 | Stop reason: SURG

## 2022-03-29 RX ADMIN — LIDOCAINE HYDROCHLORIDE 50 MG: 10 INJECTION, SOLUTION EPIDURAL; INFILTRATION; INTRACAUDAL; PERINEURAL at 07:40:00

## 2022-03-29 NOTE — OPERATIVE REPORT
[facility]  Operative Note    Binh Saunders Location: OR   CSN [id number] MRN [id number]    1955 Age 73year old   Admission Date 3/29/2022 Operation Date 3/29/2022   Attending Physician Mell Farfan MD Operating Physician Sd Pineda MD   PCP Tasia Hemphill MD          Patient Name: Binh Saunders    Preoperative Diagnosis: Change in bowel habits [R19.4]    Postoperative Diagnosis:   1. Colon polyps  2. Enlarged external hemorrhoid    Comorbid Conditions:  1. Coronary artery disease status post stenting    Primary Surgeon: Sd Pineda MD    Anesthesia: MAC    Procedures: Colonoscopy to the cecum with cold snare polypectomy    Specimen:   1. Transverse colon polyp    Estimated Blood Loss: None    Complications: None    Condition: Good    Indications for Surgery:   Binh Saunders is a 73year old male who has had a recent persisting change in bowel habits without melena or hematochezia. His previous colonoscopy was in 2017 and a polyp was removed. He also had excisional hemorrhoidectomy around that time. Family history is negative for colorectal cancer. Patient presents today for colonoscopy to evaluate potential source of change in bowel habits. Surgical Findings:   The quality of the bowel prep was excellent. A 3mm Semi-Sessile polyp was identified in the transverse colon. It was removed with cold snare polypectomy. The specimen was retrieved. Patient had a large left posterior external hemorrhoid with a degree of prolapse without thrombosis. There was a small right anterior skin tag. The cecum, ascending colon, descending colon, sigmoid colon, and rectum were unremarkable. The terminal ileum was intubated and this appeared unremarkable over the distance examined. On retroflexion there was minimal enlargement of the residual internal hemorrhoid tissue. There was scarring present given the history of hemorrhoidectomy.     Description of Procedure: The Patient was taken to the endoscopy suite and placed in the left lateral decubitus position. Monitored anesthesia care was administered. A time-out was performed. The perineum and perianal skin were examined. A digital rectal examination was performed. A lubricated colonoscope was then inserted and carefully navigated to the cecum. The appendiceal orifice and ileocecal valve were identified and photographed. The terminal ileum was intubated. The scope was then withdrawn. The cecum, ascending colon, transverse colon, descending colon, sigmoid colon, and rectum were thoroughly examined. Polypectomy was performed as described above. In the rectum the scope was retroflexed to evaluate the anorectal junction. The scope was straightened and excess gas was suctioned from the colon and the scope removed, terminating the procedure. Anesthesia was terminated and the patient transported to the recovery unit in good condition. The patient tolerated the procedure well without apparent intraoperative complication. Follow up:   Patient will need next colonoscopy in 3 to 5 years pending biopsy results. No apparent anatomic or mechanical reason for change in bowel habits.       Tamiko Ray MD  3/29/2022  8:02 AM

## 2022-03-29 NOTE — ANESTHESIA POSTPROCEDURE EVALUATION
[facility] Patient Status:  Hospital Outpatient Surgery   Age/Gender 73year old male MRN [id number]   Location [address] [address] Attending Haider Mac MD   Hosp Day # 0 PCP Yasmin Keller MD       Anesthesia Post-op Note    COLONOSCOPY with cold snare polypectomy    Procedure Summary     Date: 03/29/22 Room / Location: [address] [place] [address] ENDOSCOPY 02 / [facility] ENDOSCOPY    Anesthesia Start: 8889 Anesthesia Stop: Mauricio Self    Procedure: COLONOSCOPY with cold snare polypectomy (N/A ) Diagnosis:       Change in bowel habits      (Colon polyp)    Surgeons: Haider Mac MD Anesthesiologist: Jere Manley MD    Anesthesia Type: MAC ASA Status: 2          Anesthesia Type: MAC    Vitals Value Taken Time   BP 95/62 03/29/22 0807   Temp 98 03/29/22 0807   Pulse 72 03/29/22 0807   Resp 16 03/29/22 0807   SpO2 98 03/29/22 0807       Patient Location: Endoscopy    Anesthesia Type: MAC    Airway Patency: patent    Postop Pain Control: adequate    Mental Status: mildly sedated but able to meaningfully participate in the post-anesthesia evaluation    Nausea/Vomiting: none    Cardiopulmonary/Hydration status: stable euvolemic    Complications: no apparent anesthesia related complications    Postop vital signs: stable    Dental Exam: Unchanged from Preop    Patient to be discharged home when criteria met.

## 2022-04-01 ENCOUNTER — PATIENT OUTREACH (OUTPATIENT)
Dept: SURGERY | Facility: CLINIC | Age: 67
End: 2022-04-01

## 2022-04-04 RX ORDER — LISINOPRIL 5 MG/1
5 TABLET ORAL DAILY
Qty: 30 TABLET | Refills: 0 | Status: SHIPPED | OUTPATIENT
Start: 2022-04-04

## 2022-04-04 NOTE — TELEPHONE ENCOUNTER
Fax received from pharmacy for following refill. #30 okay per SD    [facility] sent to pt to schedule an appt.

## 2022-06-24 ENCOUNTER — OFFICE VISIT (OUTPATIENT)
Dept: PHYSICAL MEDICINE AND REHAB | Facility: CLINIC | Age: 67
End: 2022-06-24
Payer: MEDICARE

## 2022-06-24 VITALS
WEIGHT: 172 LBS | BODY MASS INDEX: 26.07 KG/M2 | HEIGHT: 68 IN | HEART RATE: 75 BPM | SYSTOLIC BLOOD PRESSURE: 130 MMHG | OXYGEN SATURATION: 98 % | DIASTOLIC BLOOD PRESSURE: 70 MMHG

## 2022-06-24 DIAGNOSIS — M47.812 ARTHROPATHY OF CERVICAL FACET JOINT: Primary | ICD-10-CM

## 2022-06-24 DIAGNOSIS — M47.816 LUMBAR FACET ARTHROPATHY: ICD-10-CM

## 2022-06-24 PROCEDURE — 99204 OFFICE O/P NEW MOD 45 MIN: CPT | Performed by: PHYSICAL MEDICINE & REHABILITATION

## 2022-06-24 RX ORDER — NAPROXEN 500 MG/1
TABLET ORAL
Qty: 30 TABLET | Refills: 0 | Status: SHIPPED | OUTPATIENT
Start: 2022-06-24

## 2022-06-28 NOTE — TELEPHONE ENCOUNTER
LOV: 10/29/21 (acute)  Last Refill: 4/4/22, #30, 0 RF  Next OV: n/a    Protocol failed.        Please contact patient to schedule a physical. Thank you!!

## 2022-07-05 ENCOUNTER — TELEPHONE (OUTPATIENT)
Dept: FAMILY MEDICINE CLINIC | Facility: CLINIC | Age: 67
End: 2022-07-05

## 2022-07-05 RX ORDER — LISINOPRIL 5 MG/1
TABLET ORAL
Qty: 30 TABLET | Refills: 0 | Status: SHIPPED | OUTPATIENT
Start: 2022-07-05

## 2022-07-05 NOTE — TELEPHONE ENCOUNTER
S/w pt. Per chart has lab orders that are still good until 7.20  Suggested he obtain those. He agrees.

## 2022-07-07 ENCOUNTER — LAB ENCOUNTER (OUTPATIENT)
Dept: LAB | Age: 67
End: 2022-07-07
Attending: FAMILY MEDICINE
Payer: MEDICARE

## 2022-07-07 DIAGNOSIS — R73.9 ELEVATED BLOOD SUGAR: ICD-10-CM

## 2022-07-07 DIAGNOSIS — Z00.00 BLOOD TESTS FOR ROUTINE GENERAL PHYSICAL EXAMINATION: ICD-10-CM

## 2022-07-07 DIAGNOSIS — Z13.89 SCREENING FOR GENITOURINARY CONDITION: ICD-10-CM

## 2022-07-07 LAB
ALBUMIN SERPL-MCNC: 3.7 G/DL (ref 3.4–5)
ALBUMIN/GLOB SERPL: 1.4 {RATIO} (ref 1–2)
ALP LIVER SERPL-CCNC: 26 U/L
ALT SERPL-CCNC: 38 U/L
ANION GAP SERPL CALC-SCNC: 7 MMOL/L (ref 0–18)
AST SERPL-CCNC: 20 U/L (ref 15–37)
BASOPHILS # BLD AUTO: 0.03 X10(3) UL (ref 0–0.2)
BASOPHILS NFR BLD AUTO: 0.7 %
BILIRUB SERPL-MCNC: 0.9 MG/DL (ref 0.1–2)
BILIRUB UR QL STRIP.AUTO: NEGATIVE
BUN BLD-MCNC: 16 MG/DL (ref 7–18)
CALCIUM BLD-MCNC: 8.7 MG/DL (ref 8.5–10.1)
CHLORIDE SERPL-SCNC: 109 MMOL/L (ref 98–112)
CHOLEST SERPL-MCNC: 134 MG/DL (ref ?–200)
CLARITY UR REFRACT.AUTO: CLEAR
CO2 SERPL-SCNC: 27 MMOL/L (ref 21–32)
COLOR UR AUTO: YELLOW
COMPLEXED PSA SERPL-MCNC: 3.9 NG/ML (ref ?–4)
CREAT BLD-MCNC: 0.84 MG/DL
EOSINOPHIL # BLD AUTO: 0.18 X10(3) UL (ref 0–0.7)
EOSINOPHIL NFR BLD AUTO: 4 %
ERYTHROCYTE [DISTWIDTH] IN BLOOD BY AUTOMATED COUNT: 12.6 %
FASTING PATIENT LIPID ANSWER: YES
FASTING STATUS PATIENT QL REPORTED: YES
GLOBULIN PLAS-MCNC: 2.7 G/DL (ref 2.8–4.4)
GLUCOSE BLD-MCNC: 107 MG/DL (ref 70–99)
GLUCOSE UR STRIP.AUTO-MCNC: NEGATIVE MG/DL
HCT VFR BLD AUTO: 44.3 %
HDLC SERPL-MCNC: 49 MG/DL (ref 40–59)
HGB BLD-MCNC: 14.6 G/DL
IMM GRANULOCYTES # BLD AUTO: 0.01 X10(3) UL (ref 0–1)
IMM GRANULOCYTES NFR BLD: 0.2 %
KETONES UR STRIP.AUTO-MCNC: NEGATIVE MG/DL
LDLC SERPL CALC-MCNC: 65 MG/DL (ref ?–100)
LEUKOCYTE ESTERASE UR QL STRIP.AUTO: NEGATIVE
LYMPHOCYTES # BLD AUTO: 1.18 X10(3) UL (ref 1–4)
LYMPHOCYTES NFR BLD AUTO: 26.1 %
MCH RBC QN AUTO: 31.9 PG (ref 26–34)
MCHC RBC AUTO-ENTMCNC: 33 G/DL (ref 31–37)
MCV RBC AUTO: 96.9 FL
MONOCYTES # BLD AUTO: 0.58 X10(3) UL (ref 0.1–1)
MONOCYTES NFR BLD AUTO: 12.8 %
NEUTROPHILS # BLD AUTO: 2.54 X10 (3) UL (ref 1.5–7.7)
NEUTROPHILS # BLD AUTO: 2.54 X10(3) UL (ref 1.5–7.7)
NEUTROPHILS NFR BLD AUTO: 56.2 %
NITRITE UR QL STRIP.AUTO: NEGATIVE
NONHDLC SERPL-MCNC: 85 MG/DL (ref ?–130)
OSMOLALITY SERPL CALC.SUM OF ELEC: 298 MOSM/KG (ref 275–295)
PH UR STRIP.AUTO: 7 [PH] (ref 5–8)
PLATELET # BLD AUTO: 154 10(3)UL (ref 150–450)
POTASSIUM SERPL-SCNC: 4.1 MMOL/L (ref 3.5–5.1)
PROT SERPL-MCNC: 6.4 G/DL (ref 6.4–8.2)
PROT UR STRIP.AUTO-MCNC: NEGATIVE MG/DL
RBC # BLD AUTO: 4.57 X10(6)UL
RBC UR QL AUTO: NEGATIVE
SODIUM SERPL-SCNC: 143 MMOL/L (ref 136–145)
SP GR UR STRIP.AUTO: 1.02 (ref 1–1.03)
TRIGL SERPL-MCNC: 111 MG/DL (ref 30–149)
TSI SER-ACNC: 1.45 MIU/ML (ref 0.36–3.74)
UROBILINOGEN UR STRIP.AUTO-MCNC: 0.2 MG/DL
VLDLC SERPL CALC-MCNC: 17 MG/DL (ref 0–30)
WBC # BLD AUTO: 4.5 X10(3) UL (ref 4–11)

## 2022-07-07 PROCEDURE — 81003 URINALYSIS AUTO W/O SCOPE: CPT

## 2022-07-07 PROCEDURE — 80053 COMPREHEN METABOLIC PANEL: CPT

## 2022-07-07 PROCEDURE — 83036 HEMOGLOBIN GLYCOSYLATED A1C: CPT

## 2022-07-07 PROCEDURE — 84443 ASSAY THYROID STIM HORMONE: CPT

## 2022-07-07 PROCEDURE — 85025 COMPLETE CBC W/AUTO DIFF WBC: CPT

## 2022-07-07 PROCEDURE — 36415 COLL VENOUS BLD VENIPUNCTURE: CPT

## 2022-07-07 PROCEDURE — 80061 LIPID PANEL: CPT

## 2022-07-08 DIAGNOSIS — R73.9 ELEVATED BLOOD SUGAR: Primary | ICD-10-CM

## 2022-07-08 LAB
EST. AVERAGE GLUCOSE BLD GHB EST-MCNC: 120 MG/DL (ref 68–126)
HBA1C MFR BLD: 5.8 % (ref ?–5.7)

## 2022-07-14 ENCOUNTER — TELEPHONE (OUTPATIENT)
Dept: PHYSICAL THERAPY | Facility: HOSPITAL | Age: 67
End: 2022-07-14

## 2022-07-26 ENCOUNTER — OFFICE VISIT (OUTPATIENT)
Dept: PHYSICAL THERAPY | Facility: HOSPITAL | Age: 67
End: 2022-07-26
Attending: PHYSICAL MEDICINE & REHABILITATION
Payer: MEDICARE

## 2022-07-26 DIAGNOSIS — M47.812 ARTHROPATHY OF CERVICAL FACET JOINT: ICD-10-CM

## 2022-07-26 DIAGNOSIS — M47.816 LUMBAR FACET ARTHROPATHY: ICD-10-CM

## 2022-07-26 PROCEDURE — 97110 THERAPEUTIC EXERCISES: CPT

## 2022-07-26 PROCEDURE — 97162 PT EVAL MOD COMPLEX 30 MIN: CPT

## 2022-07-29 ENCOUNTER — OFFICE VISIT (OUTPATIENT)
Dept: PHYSICAL THERAPY | Facility: HOSPITAL | Age: 67
End: 2022-07-29
Attending: PHYSICAL MEDICINE & REHABILITATION
Payer: MEDICARE

## 2022-07-29 PROCEDURE — 97110 THERAPEUTIC EXERCISES: CPT

## 2022-07-29 NOTE — PROGRESS NOTES
Diagnosis:   Arthropathy of cervical facet joint (J07.201)  Lumbar facet arthropathy (Z07.244)     Referring Provider: Anca Julien  Date of Evaluation:   7/26/22    Precautions:  None Next MD visit:   none scheduled  Date of Surgery: n/a   Insurance Primary/Secondary: MEDICARE / Mary Cola     # Auth Visits: 8 POC            Subjective: The patient reports that his neck is sore. He notes that he went for a bike ride, did 18 miles same day as eval. Wednesday and Thursday he was sore mid back (below shoulder blades) more R side. Neck is better today. Did exercises wednesday, didn't do them yesterday, and did them this morning. The exercises feel ok. Pain: 2/10 with shrugging shoulders (in neck)      Objective:     Cervical spine range of motion not measured today, pt had pain with end range lateral flexion and rotation on L side. Assessment: Held manual therapy today as the patient had quite a bit of soreness following the initial evaluation. Focused on performing stretching exercises and added a chin tuck for home. Jose noted some soreness with lateral cervical flexion at CT junction at the end of the session, but no pain with exercises while performing these. Goals:    (to be met in 8 visits)   1. The patient will demonstrate at least 30 deg pain free lumbar extension AROM  2. The patient will report no increased soreness following yardwork  3. The patient will demonstrate pain free lateral cervical flexion bilaterally  4. The patient will report being able to turn the head to look behind the shoulder without pain  5. The patient will demonstrate independence and adherence to a home stretching program to maintain flexibility    Plan: continue stretching, PPIVMs in neck. Date: 7/29/2022  TX#: 2/8 Date:                 TX#: 3/ Date:                 TX#: 4/ Date:                 TX#: 5/ Date:    Tx#: 6/   Therapeutic Exercise  Levator scapulae stretch 2x10 sec ea  Open book thoracic rotation stretch 10x ea  FR posture series on foam beam: arms scissors, T, snow too 8x ea. W 3x30 sec  Chin tucks on foam beam 5 sec hold, 2x10  LTR 10x       X       X       X       HEP:   Access Code: [email or web address]  URL: [email or web address]  Date: 07/29/2022  Prepared by: Nav Lira    Exercises  Sidelying Thoracic Rotation with Open Book - 1 x daily - 7 x weekly - 1 sets - 10 reps  Lower Trunk Rotations - 1 x daily - 7 x weekly - 1 sets - 10 reps  Gentle Levator Scapulae Stretch - 1 x daily - 7 x weekly - 1 sets - 3 reps - 10-20 sec hold  Supine Chin Tuck - 1 x daily - 7 x weekly - 2 sets - 10 reps - 5 hold      Charges: therapeutic exercise x3       Total Timed Treatment: 45 min  Total Treatment Time: 45 min

## 2022-08-01 RX ORDER — LISINOPRIL 5 MG/1
TABLET ORAL
Qty: 30 TABLET | Refills: 0 | Status: SHIPPED | OUTPATIENT
Start: 2022-08-01

## 2022-08-02 ENCOUNTER — OFFICE VISIT (OUTPATIENT)
Dept: PHYSICAL THERAPY | Facility: HOSPITAL | Age: 67
End: 2022-08-02
Attending: PHYSICAL MEDICINE & REHABILITATION
Payer: MEDICARE

## 2022-08-02 PROCEDURE — 97140 MANUAL THERAPY 1/> REGIONS: CPT

## 2022-08-02 PROCEDURE — 97110 THERAPEUTIC EXERCISES: CPT

## 2022-08-02 NOTE — PROGRESS NOTES
Diagnosis:   Arthropathy of cervical facet joint (L67.698)  Lumbar facet arthropathy (H77.937)     Referring Provider: Enrike Morales  Date of Evaluation:   7/26/22    Precautions:  None Next MD visit:   none scheduled  Date of Surgery: n/a   Insurance Primary/Secondary: MEDICARE / Arthea Ondina     # Auth Visits: 8 POC            Subjective: The patient reports that he is still sore, more in the mid back. He reports that the soreness doesn't restrict him from normal activities, the soreness is on the right side. He states that the symptoms are there more than it's not, but not constant. He states that he is  Not sure if exercises are causing it. The patient notes that the rotation stretch doesn't seem to cause it. He states that the neck is about the same as it was in the beginning, he has felt it more in the last couple of days. The patient reports that he did some biking on Friday or Sunday, that was fine and didn't increase neck pain. Pain: 2/10 with shrugging shoulders (in neck)      Objective:       Rotation R 72, L 68, lateral flexion R 18, L 22 tight on L with LF.     PPIVM rotation mobilizations: noted hypomobility in lower cervical spine L>R. Palpation: there is point tenderness and increased soft tissue tension present to the upper trapezius, levator scapulae ROLDAN L>R      Assessment: The patient reports a tightness and pulling with lateral flexion and rotation range of motion of the cervical spine. Added PPIVM mobilizations and soft tissue mobilization to address this, and Rich reported some relief of the tightness at the end of the session. Added a bridge for home. Goals:    (to be met in 8 visits)   1. The patient will demonstrate at least 30 deg pain free lumbar extension AROM  2. The patient will report no increased soreness following yardwork  3. The patient will demonstrate pain free lateral cervical flexion bilaterally  4.  The patient will report being able to turn the head to look behind the shoulder without pain  5. The patient will demonstrate independence and adherence to a home stretching program to maintain flexibility    Plan: continue stretching, PPIVMs in neck. Date: 7/29/2022  TX#: 2/8 Date: 8/2/2022            TX#: 3/8 Date:                 TX#: 4/ Date:                 TX#: 5/ Date: Tx#: 6/   Therapeutic Exercise  Levator scapulae stretch 2x10 sec ea  Open book thoracic rotation stretch 10x ea  FR posture series on foam beam: arms scissors, T, snow too 8x ea. W 3x30 sec  Chin tucks on foam beam 5 sec hold, 2x10  LTR 10x Therapeutic Exercise  Child pose stretch 2x30 sec  Child pose with lateral trunk flexion 2x30 sec ea  Bridge 3x8  Chin tuck + head lift 10x, 5 sec hold  Upper trap/levator stretch 2x20 sec ea      X Manual therapy  STM to thoracic paraspinals, QL and upper trap/levator for pain relief  x9 min      X X      X X      HEP:   Access Code: [email or web address]  URL: [email or web address]  Date: 08/02/2022  Prepared by: Franchot Seat    Exercises  Sidelying Thoracic Rotation with Open Book - 1 x daily - 7 x weekly - 1 sets - 10 reps  Lower Trunk Rotations - 1 x daily - 7 x weekly - 1 sets - 10 reps  Gentle Levator Scapulae Stretch - 1 x daily - 7 x weekly - 1 sets - 3 reps - 10-20 sec hold  Supine Deep Neck Flexor Training - Repetitions - 1 x daily - 7 x weekly - 1 sets - 10 reps - 5 hold  Supine Bridge - 1 x daily - 7 x weekly - 3 sets - 8 reps        Charges: therapeutic exercise x2, man ther x1      Total Timed Treatment: 45 min  Total Treatment Time: 45 min

## 2022-08-05 ENCOUNTER — APPOINTMENT (OUTPATIENT)
Dept: PHYSICAL THERAPY | Facility: HOSPITAL | Age: 67
End: 2022-08-05
Attending: PHYSICAL MEDICINE & REHABILITATION
Payer: MEDICARE

## 2022-08-05 ENCOUNTER — OFFICE VISIT (OUTPATIENT)
Dept: PHYSICAL THERAPY | Facility: HOSPITAL | Age: 67
End: 2022-08-05
Attending: FAMILY MEDICINE
Payer: MEDICARE

## 2022-08-05 PROCEDURE — 97110 THERAPEUTIC EXERCISES: CPT

## 2022-08-05 PROCEDURE — 97140 MANUAL THERAPY 1/> REGIONS: CPT

## 2022-08-05 NOTE — PROGRESS NOTES
Diagnosis:   Arthropathy of cervical facet joint (D82.400)  Lumbar facet arthropathy (W66.479)     Referring Provider: No ref. provider found  Date of Evaluation:   7/26/22    Precautions:  None Next MD visit:   none scheduled  Date of Surgery: n/a   Insurance Primary/Secondary: MEDICARE / Selena Montemayor     # Auth Visits: 8 POC            Subjective: The patient reports that the symptoms in the R thoracic is a little better, he did not have a tennis ball so he couldn't do the self soft tissue mobilization. He reports that he had no increased soreness in neck after working on it last time. Pain: 1-2/10 (in neck)      Objective:       Rotation R 72, L 68, lateral flexion R 18, L 22 tight on L with LF. Palpation: there is point tenderness and increased soft tissue tension present to the upper trapezius, levator scapulae ROLDAN L>R    First Rib Mobility: mobility is normal ROLDAN      Assessment: Continued with soft tissue mobilization and mostly focused on performing this in upper trapezius and levator scapulae as the patient had increased tension in this area on L side. Progressed to scapular strength to further improve postural strength and decrease utilization of upper trapezius musculature. Goals:    (to be met in 8 visits)   1. The patient will demonstrate at least 30 deg pain free lumbar extension AROM  2. The patient will report no increased soreness following yardwork  3. The patient will demonstrate pain free lateral cervical flexion bilaterally  4. The patient will report being able to turn the head to look behind the shoulder without pain  5. The patient will demonstrate independence and adherence to a home stretching program to maintain flexibility      Plan: STM upper trapezius, continue thoracic extension  Date: 7/29/2022  TX#: 2/8 Date: 8/2/2022            TX#: 3/8 Date: 8/5/2022            TX#: 4/8 Date:                 TX#: 5/ Date:    Tx#: 6/   Therapeutic Exercise  Levator scapulae stretch 2x10 sec ea  Open book thoracic rotation stretch 10x ea  FR posture series on foam beam: arms scissors, T, snow too 8x ea. W 3x30 sec  Chin tucks on foam beam 5 sec hold, 2x10  LTR 10x Therapeutic Exercise  Child pose stretch 2x30 sec  Child pose with lateral trunk flexion 2x30 sec ea  Bridge 3x8  Chin tuck + head lift 10x, 5 sec hold  Upper trap/levator stretch 2x20 sec ea Therapeutic Exercise  Low row green TB 2x12  Shoulder extension green TB 2x12  Thoracic extension over towel rolls 3x  Bridge with green band pull apart 2x8  Upper trap/levator stretch 3x20 sec ea     X Manual therapy  STM to thoracic paraspinals, QL and upper trap/levator for pain relief  x9 min Manual therapy  STM to thoracic paraspinals, QL and upper trap/levator on L for pain relief  x10 min     X X X     X X X     HEP:   Access Code: [email or web address]  URL: [email or web address]  Date: 08/02/2022  Prepared by: Melva Silver    Exercises  Sidelying Thoracic Rotation with Open Book - 1 x daily - 7 x weekly - 1 sets - 10 reps  Lower Trunk Rotations - 1 x daily - 7 x weekly - 1 sets - 10 reps  Gentle Levator Scapulae Stretch - 1 x daily - 7 x weekly - 1 sets - 3 reps - 10-20 sec hold  Supine Deep Neck Flexor Training - Repetitions - 1 x daily - 7 x weekly - 1 sets - 10 reps - 5 hold  Supine Bridge - 1 x daily - 7 x weekly - 3 sets - 8 reps        Charges: therapeutic exercise x2, man ther x1      Total Timed Treatment: 45 min  Total Treatment Time: 45 min

## 2022-08-09 ENCOUNTER — OFFICE VISIT (OUTPATIENT)
Dept: PHYSICAL THERAPY | Facility: HOSPITAL | Age: 67
End: 2022-08-09
Attending: PHYSICAL MEDICINE & REHABILITATION
Payer: MEDICARE

## 2022-08-09 PROCEDURE — 97140 MANUAL THERAPY 1/> REGIONS: CPT

## 2022-08-09 PROCEDURE — 97110 THERAPEUTIC EXERCISES: CPT

## 2022-08-09 NOTE — PROGRESS NOTES
Diagnosis:   Arthropathy of cervical facet joint (R85.128)  Lumbar facet arthropathy (W73.301)     Referring Provider: Amada Rossi  Date of Evaluation:   7/26/22    Precautions:  None Next MD visit:   none scheduled  Date of Surgery: n/a   Insurance Primary/Secondary: MEDICARE / [employer]     # Auth Visits: 8 POC            Subjective: The patient reports that things are feeling better, he painted the bathroom ceiling on [place], it didn't feel bad- he took 1 naproxen afterwards. He reports that he feels a little discomfort in the lower thoracic spine, just a tad on R side. Jose reports that he has been using the tennis ball, that has helped. Feels symptoms in low back. He notes that he rode his bike on [email or web address], had no problems with the neck with this. Pain: 0/10      Objective:       Rotation R 72, L 68, lateral flexion R 18, L 22 tight on L with LF. Palpation: there is point tenderness and increased soft tissue tension present to the upper trapezius, levator scapulae ROLDAN L>R, lumbar paraspinals/QL on L    First Rib Mobility: mobility is normal ROLDAN      Assessment: Jose has noted improvements in his symptoms in the last few days. He has some continued increased soft tissue tension in lumbar/thoracic paraspinals and upper trapezius, therefore continued with soft tissue mobilization to address this. The patient was able to progress the bridging exercise to perform with a march to increase difficulty. Will continue to focus on improving mobility and strength. Goals:    (to be met in 8 visits)   1. The patient will demonstrate at least 30 deg pain free lumbar extension AROM  2. The patient will report no increased soreness following yardwork  3. The patient will demonstrate pain free lateral cervical flexion bilaterally  4. The patient will report being able to turn the head to look behind the shoulder without pain  5.  The patient will demonstrate independence and adherence to a home stretching program to maintain flexibility      Plan: progress strength  Date: 7/29/2022  TX#: 2/8 Date: 8/2/2022            TX#: 3/8 Date: 8/5/2022            TX#: 4/8 Date: 8/9/2022            TX#: 5/8 Date: Tx#: 6/   Therapeutic Exercise  Levator scapulae stretch 2x10 sec ea  Open book thoracic rotation stretch 10x ea  FR posture series on foam beam: arms scissors, T, snow too 8x ea. W 3x30 sec  Chin tucks on foam beam 5 sec hold, 2x10  LTR 10x Therapeutic Exercise  Child pose stretch 2x30 sec  Child pose with lateral trunk flexion 2x30 sec ea  Bridge 3x8  Chin tuck + head lift 10x, 5 sec hold  Upper trap/levator stretch 2x20 sec ea Therapeutic Exercise  Low row green TB 2x12  Shoulder extension green TB 2x12  Thoracic extension over towel rolls 3x  Bridge with green band pull apart 2x8  Upper trap/levator stretch 3x20 sec ea Therapeutic Exercise  LTR 10x  Thoracic extension over 2 towel rolls 3x  Bridge march 2x8  Dead bug 2x5  Standing hip abduction red TB 2x10 ea  Low row Precor 20# 2x12  HS stretching strap 2x30 sec ea  Standing lateral trunk flexion stretching to R 3x10 sec    X Manual therapy  STM to thoracic paraspinals, QL and upper trap/levator for pain relief  x9 min Manual therapy  STM to thoracic paraspinals, QL and upper trap/levator on L for pain relief  x10 min Manual therapy  STM to thoracic paraspinals, QL and upper trap/levator on L for pain relief  x10 min    X X X X    X X X X    HEP:   Access Code: [email or web address]  URL: [email or web address]  Date: 08/02/2022  Prepared by: Juan Hopson    Exercises  Sidelying Thoracic Rotation with Open Book - 1 x daily - 7 x weekly - 1 sets - 10 reps  Lower Trunk Rotations - 1 x daily - 7 x weekly - 1 sets - 10 reps  Gentle Levator Scapulae Stretch - 1 x daily - 7 x weekly - 1 sets - 3 reps - 10-20 sec hold  Supine Deep Neck Flexor Training - Repetitions - 1 x daily - 7 x weekly - 1 sets - 10 reps - 5 hold  Supine Bridge - 1 x daily - 7 x weekly - 3 sets - 8 reps        Charges: therapeutic exercise x2, man ther x1      Total Timed Treatment: 45 min  Total Treatment Time: 45 min

## 2022-08-10 RX ORDER — LISINOPRIL 5 MG/1
TABLET ORAL
Qty: 30 TABLET | Refills: 0 | Status: SHIPPED | OUTPATIENT
Start: 2022-08-10

## 2022-08-12 ENCOUNTER — OFFICE VISIT (OUTPATIENT)
Dept: PHYSICAL THERAPY | Facility: HOSPITAL | Age: 67
End: 2022-08-12
Attending: PHYSICAL MEDICINE & REHABILITATION
Payer: MEDICARE

## 2022-08-12 PROCEDURE — 97140 MANUAL THERAPY 1/> REGIONS: CPT

## 2022-08-12 PROCEDURE — 97110 THERAPEUTIC EXERCISES: CPT

## 2022-08-12 NOTE — PROGRESS NOTES
Diagnosis:   Arthropathy of cervical facet joint (N04.941)  Lumbar facet arthropathy (J92.375)     Referring Provider: Lon Estrella  Date of Evaluation:   7/26/22    Precautions:  None Next MD visit:   none scheduled  Date of Surgery: n/a   Insurance Primary/Secondary: MEDICARE / [address] [place] [address]     # Auth Visits: 8 POC            Subjective: The patient reports that he is feeling better. He notes that his neck feels better, back/side feels better. He states that he still feels it if he moves a certain way, but there hasn't been chronic nagging symptoms in the neck. Pain: 0/10 8/12/2022      Objective:       Rotation R 76, L 65 (improved to 70 following manual therapy), lateral flexion R 18, L 20 tight on L with LF. Palpation: there is point tenderness and increased soft tissue tension present to the upper trapezius, levator scapulae ROLDAN L>R, lumbar paraspinals/QL on L    First Rib Mobility: mobility is normal ROLDAN      Assessment: Continued with manual therapy interventions to improve mobility in the cervical spine and reduce reports of tightness. Tony Spicer had some reduction in the tightness both after P-A mobilizations/soft tissue and then after the SNAG exercise in sitting. Progressed scapular strength to work to maintain these improvements. Goals:    (to be met in 8 visits)   1. The patient will demonstrate at least 30 deg pain free lumbar extension AROM  2. The patient will report no increased soreness following yardwork  3. The patient will demonstrate pain free lateral cervical flexion bilaterally  4. The patient will report being able to turn the head to look behind the shoulder without pain  5.  The patient will demonstrate independence and adherence to a home stretching program to maintain flexibility      Plan: progress strength  Date: 7/29/2022  TX#: 2/8 Date: 8/2/2022            TX#: 3/8 Date: 8/5/2022            TX#: 4/8 Date: 8/9/2022            TX#: 5/8 Date: 8/12/2022  Tx#: 6/8   Therapeutic Exercise  Levator scapulae stretch 2x10 sec ea  Open book thoracic rotation stretch 10x ea  FR posture series on foam beam: arms scissors, T, snow too 8x ea. W 3x30 sec  Chin tucks on foam beam 5 sec hold, 2x10  LTR 10x Therapeutic Exercise  Child pose stretch 2x30 sec  Child pose with lateral trunk flexion 2x30 sec ea  Bridge 3x8  Chin tuck + head lift 10x, 5 sec hold  Upper trap/levator stretch 2x20 sec ea Therapeutic Exercise  Low row green TB 2x12  Shoulder extension green TB 2x12  Thoracic extension over towel rolls 3x  Bridge with green band pull apart 2x8  Upper trap/levator stretch 3x20 sec ea Therapeutic Exercise  LTR 10x  Thoracic extension over 2 towel rolls 3x  Bridge march 2x8  Dead bug 2x5  Standing hip abduction red TB 2x10 ea  Low row Precor 20# 2x12  HS stretching strap 2x30 sec ea  Standing lateral trunk flexion stretching to R 3x10 sec  Partial lunge on BOSU with upper trunk rotation 2x10  HS stretch strep 2x30 sec ea Therapeutic Exercise  Chin tuck head lift 5 sec hold, 5x  Shoulder extension 20# 2x12  Low row precor 20# 2x12  HS stretch with strap 2x30 sec ea   X Manual therapy  STM to thoracic paraspinals, QL and upper trap/levator for pain relief  x9 min Manual therapy  STM to thoracic paraspinals, QL and upper trap/levator on L for pain relief  x10 min Manual therapy  STM to thoracic paraspinals, QL and upper trap/levator on L for pain relief  x10 min Manual therapy  STM to upper trap  P-A unilateral at CT junction and lower cervical spine on L  SNAG R/L rotation 10x ea with at C3 L unilateral  x10 min   X X X X X   X X X X X   HEP:   Access Code: [email or web address]  URL: [email or web address]  Date: 08/12/2022  Prepared by: Dino Winters    Exercises  Sidelying Thoracic Rotation with Open Book - 1 x daily - 7 x weekly - 1 sets - 10 reps  Lower Trunk Rotations - 1 x daily - 7 x weekly - 1 sets - 10 reps  Gentle Levator Scapulae Stretch - 1 x daily - 7 x weekly - 1 sets - 3 reps - 10-20 sec hold  Supine Deep Neck Flexor Training - Repetitions - 1 x daily - 7 x weekly - 1 sets - 10 reps - 5 hold  Marching Bridge - 1 x daily - 7 x weekly - 3 sets - 8 reps  Supine Dead Bug with Leg Extension - 1 x daily - 7 x weekly - 2 sets - 5 reps        Charges: therapeutic exercise x2, man ther x1      Total Timed Treatment: 45 min  Total Treatment Time: 45 min

## 2022-08-16 ENCOUNTER — OFFICE VISIT (OUTPATIENT)
Dept: PHYSICAL THERAPY | Facility: HOSPITAL | Age: 67
End: 2022-08-16
Attending: PHYSICAL MEDICINE & REHABILITATION
Payer: MEDICARE

## 2022-08-16 PROCEDURE — 97110 THERAPEUTIC EXERCISES: CPT

## 2022-08-16 PROCEDURE — 97140 MANUAL THERAPY 1/> REGIONS: CPT

## 2022-08-16 NOTE — PROGRESS NOTES
Diagnosis:   Arthropathy of cervical facet joint (D28.211)  Lumbar facet arthropathy (Y08.058)     Referring Provider: Saeed Roberson  Date of Evaluation:   7/26/22    Precautions:  None Next MD visit:   none scheduled  Date of Surgery: n/a   Insurance Primary/Secondary: MEDICARE / [employer]     # Auth Visits: 8 POC            Subjective: The patient reports that his back is better, he feels occasional pain L side of neck with certain motions. Pain: 0/10 8/16/2022      Objective:       Cervical AROM: not measured today but pt feels pain with end range R lateral flexion, R/L rotation      Palpation: there is point tenderness and increased soft tissue tension present to the upper trapezius, levator scapulae ROLDAN L>R, lumbar paraspinals/QL on L    First Rib Mobility: mobility is normal ROLDAN      Assessment: The patient has some persistent impairments in mobility and tightness at CT junction and upper thoracic spine. Symptoms usually improve following manual therapy techniques. Updated his HEP to include several self stretching exercises today. Will plan to have Rich trial a HEP of self stretches, and will follow up after this in a few weeks. Goals:    (to be met in 8 visits)   1. The patient will demonstrate at least 30 deg pain free lumbar extension AROM  2. The patient will report no increased soreness following yardwork  3. The patient will demonstrate pain free lateral cervical flexion bilaterally  4. The patient will report being able to turn the head to look behind the shoulder without pain  5.  The patient will demonstrate independence and adherence to a home stretching program to maintain flexibility      Plan: progress strength  Date: 8/5/2022            TX#: 4/8 Date: 8/9/2022            TX#: 5/8 Date: 8/12/2022  Tx#: 6/8 Date: 8/16/2022  Tx#: 7/8   Therapeutic Exercise  Low row green TB 2x12  Shoulder extension green TB 2x12  Thoracic extension over towel rolls 3x  Bridge with green band pull apart 2x8  Upper trap/levator stretch 3x20 sec ea Therapeutic Exercise  LTR 10x  Thoracic extension over 2 towel rolls 3x  Bridge march 2x8  Dead bug 2x5  Standing hip abduction red TB 2x10 ea  Low row Precor 20# 2x12  HS stretching strap 2x30 sec ea  Standing lateral trunk flexion stretching to R 3x10 sec  Partial lunge on BOSU with upper trunk rotation 2x10  HS stretch strep 2x30 sec ea Therapeutic Exercise  Chin tuck head lift 5 sec hold, 5x  Shoulder extension 20# 2x12  Low row precor 20# 2x12  HS stretch with strap 2x30 sec ea Therapeutic Exercise  Hands behind the head upper thoracic stretching 10x  Upper trap stretch 2x10-20 sec  Bridge march 2x8  Review dead bug form 10x  Trunk rotation on wall 10x ea direction L UE   Manual therapy  STM to thoracic paraspinals, QL and upper trap/levator on L for pain relief  x10 min Manual therapy  STM to thoracic paraspinals, QL and upper trap/levator on L for pain relief  x10 min Manual therapy  STM to upper trap  P-A unilateral at CT junction and lower cervical spine on L  SNAG R/L rotation 10x ea with at C3 L unilateral  x10 min Manual therapy  STM to upper trap on L  P-A unilateral at CT junction and lower cervical spine on L   X X X X   X X X X   HEP:   Access Code: [email or web address]  URL: [email or web address]  Date: 08/16/2022  Prepared by: Moose Sanabria    Exercises  Sidelying Thoracic Rotation with Open Book - 1 x daily - 7 x weekly - 1 sets - 10 reps  Lower Trunk Rotations - 1 x daily - 7 x weekly - 1 sets - 10 reps  Supine Deep Neck Flexor Training - Repetitions - 1 x daily - 7 x weekly - 1 sets - 10 reps - 5 hold  Marching Bridge - 1 x daily - 7 x weekly - 3 sets - 8 reps  Supine Dead Bug with Leg Extension - 1 x daily - 7 x weekly - 2 sets - 5-10 reps  Thoracic Extension Mobilization on Foam Roll - 1 x daily - 7 x weekly - 1 sets - 5 reps  Gentle Levator Scapulae Stretch - 1 x daily - 7 x weekly - 1 sets - 3 reps - 10-20 sec hold  Seated Gentle Upper Trapezius Stretch - 1 x daily - 7 x weekly - 1 sets - 3 reps  Standing with Forearms Thoracic Rotation - 1 x daily - 7 x weekly - 1 sets - 10 reps        Charges: therapeutic exercise x2, man ther x1      Total Timed Treatment: 45 min  Total Treatment Time: 45 min

## 2022-08-19 ENCOUNTER — APPOINTMENT (OUTPATIENT)
Dept: PHYSICAL THERAPY | Facility: HOSPITAL | Age: 67
End: 2022-08-19
Attending: PHYSICAL MEDICINE & REHABILITATION
Payer: MEDICARE

## 2022-09-01 ENCOUNTER — OFFICE VISIT (OUTPATIENT)
Dept: FAMILY MEDICINE CLINIC | Facility: CLINIC | Age: 67
End: 2022-09-01
Payer: MEDICARE

## 2022-09-01 VITALS
TEMPERATURE: 98 F | RESPIRATION RATE: 16 BRPM | SYSTOLIC BLOOD PRESSURE: 132 MMHG | BODY MASS INDEX: 26.84 KG/M2 | HEART RATE: 72 BPM | DIASTOLIC BLOOD PRESSURE: 80 MMHG | OXYGEN SATURATION: 98 % | WEIGHT: 173 LBS | HEIGHT: 67.5 IN

## 2022-09-01 DIAGNOSIS — Z00.00 ENCOUNTER FOR ANNUAL HEALTH EXAMINATION: Primary | ICD-10-CM

## 2022-09-01 DIAGNOSIS — R06.83 SNORING: ICD-10-CM

## 2022-09-01 DIAGNOSIS — I10 BENIGN ESSENTIAL HYPERTENSION: ICD-10-CM

## 2022-09-01 DIAGNOSIS — I25.10 CORONARY ARTERY DISEASE INVOLVING NATIVE CORONARY ARTERY OF NATIVE HEART WITHOUT ANGINA PECTORIS: ICD-10-CM

## 2022-09-01 PROCEDURE — G0402 INITIAL PREVENTIVE EXAM: HCPCS | Performed by: FAMILY MEDICINE

## 2022-09-01 PROCEDURE — 99213 OFFICE O/P EST LOW 20 MIN: CPT | Performed by: FAMILY MEDICINE

## 2022-09-01 RX ORDER — LISINOPRIL 5 MG/1
5 TABLET ORAL DAILY
Qty: 90 TABLET | Refills: 1 | Status: SHIPPED | OUTPATIENT
Start: 2022-09-01

## 2022-09-06 ENCOUNTER — OFFICE VISIT (OUTPATIENT)
Dept: PHYSICAL THERAPY | Facility: HOSPITAL | Age: 67
End: 2022-09-06
Attending: FAMILY MEDICINE
Payer: MEDICARE

## 2022-09-06 PROCEDURE — 97110 THERAPEUTIC EXERCISES: CPT

## 2022-09-06 PROCEDURE — 97140 MANUAL THERAPY 1/> REGIONS: CPT

## 2022-09-06 NOTE — PROGRESS NOTES
Diagnosis:   Arthropathy of cervical facet joint (A28.262)  Lumbar facet arthropathy (G49.314)     Referring Provider: No ref. provider found  Date of Evaluation:   7/26/22    Precautions:  None Next MD visit:   none scheduled  Date of Surgery: n/a    Discharge Summary  Pt has attended 8 visits in Physical Therapy. Insurance Primary/Secondary: MEDICARE / [facility]     # Auth Visits: 8 POC            Subjective: The patient reports that he is feeling pretty good. He notes that he played golf yesterday and is a litle sore from that, but overall is feeling good. He notes that he is feeling benefit with the home exercises, and he feels like they are working as long as he does hem. He reports that he doesn't have much pain in the thoracic spine, and his L side of the neck has been better but a little sore this morning. Tony Spicer states that he feels like he has more flexibility and ROM, he notes that he is comfortable with continuing on with his home program.    Pain: 2-3/10 in neck (sore/stiff) 9/6/2022      Objective:     Cervical AROM: (* denotes performed with pain)  Flexion: 54 (was 45)  Extension: 57 (was 54)  Sidebending: R 22 (was 18); L 16 (was 18)  Rotation: R 75; L 70 (was 76) (tight)    Lumbar AROM: (* denotes performed with pain)  Flexion: 85 (was 80)  Extension: 30 (was 25)  Sidebending: R WNL; L WNL  Rotation: R WNL; L WNL*      Palpation: there is point tenderness and increased soft tissue tension present to the L upper trapezius    First Rib Mobility: mobility is normal ROLDAN      Assessment: The patient has had some improvements in range of motion in all directions except L rotation. He does have persistent increased soft tissue tension and point tenderness to the left upper trapezius. The patient has demonstrated independence with his home program and has met long term goals, therefore he will be discharged from this episode of care for physical therapy.        Goals:    (to be met in 8 visits) 1. The patient will demonstrate at least 30 deg pain free lumbar extension AROM   2. The patient will report no increased soreness following yardwork MET  3. The patient will demonstrate pain free lateral cervical flexion bilaterally   4. The patient will report being able to turn the head to look behind the shoulder without pain MET (STRETCHING)  5. The patient will demonstrate independence and adherence to a home stretching program to maintain flexibility MET    FOTO: 73    Plan: The patient will be discharged from this episode of care for physical therapy at this time. Patient/Family/Caregiver was advised of these findings, precautions, and treatment options and has agreed to actively participate in planning and for this course of care. Thank you for your referral. If you have any questions, please contact me at Dept: [phone].     Sincerely,  Electronically signed by therapist: Melquiades Man, PT       Certification From: 1/3/6655  To:12/5/2022         Plan: progress strength  Date: 8/5/2022            TX#: 4/8 Date: 8/9/2022            TX#: 5/8 Date: 8/12/2022  Tx#: 6/8 Date: 8/16/2022  Tx#: 7/8 Date: 9/6/2022  Tx#: 8/8   Therapeutic Exercise  Low row green TB 2x12  Shoulder extension green TB 2x12  Thoracic extension over towel rolls 3x  Bridge with green band pull apart 2x8  Upper trap/levator stretch 3x20 sec ea Therapeutic Exercise  LTR 10x  Thoracic extension over 2 towel rolls 3x  Bridge march 2x8  Dead bug 2x5  Standing hip abduction red TB 2x10 ea  Low row Precor 20# 2x12  HS stretching strap 2x30 sec ea  Standing lateral trunk flexion stretching to R 3x10 sec  Partial lunge on BOSU with upper trunk rotation 2x10  HS stretch strep 2x30 sec ea Therapeutic Exercise  Chin tuck head lift 5 sec hold, 5x  Shoulder extension 20# 2x12  Low row precor 20# 2x12  HS stretch with strap 2x30 sec ea Therapeutic Exercise  Hands behind the head upper thoracic stretching 10x  Upper trap stretch 2x10-20 sec  Bridge march 2x8  Review dead bug form 10x  Trunk rotation on wall 10x ea direction L UE Therapeutic Exercise  Reassessment objective findings  Instruction in use of theracane for STM  Upper trap stretch 3x10-20 sec     Manual therapy  STM to thoracic paraspinals, QL and upper trap/levator on L for pain relief  x10 min Manual therapy  STM to thoracic paraspinals, QL and upper trap/levator on L for pain relief  x10 min Manual therapy  STM to upper trap  P-A unilateral at CT junction and lower cervical spine on L  SNAG R/L rotation 10x ea with at C3 L unilateral  x10 min Manual therapy  STM to upper trap on L  P-A unilateral at CT junction and lower cervical spine on L Manual therapy  STM to upper trap on L 10 min   X X X X X   HEP:   Access Code: [email or web address]  URL: [email or web address]  Date: 09/06/2022  Prepared by: Elenor Pace    Exercises  Sidelying Thoracic Rotation with Open Book - 1 x daily - 7 x weekly - 1 sets - 10 reps  Lower Trunk Rotations - 1 x daily - 7 x weekly - 1 sets - 10 reps  Supine Deep Neck Flexor Training - Repetitions - 1 x daily - 7 x weekly - 1 sets - 10 reps - 5 hold  Marching Bridge - 1 x daily - 7 x weekly - 3 sets - 8 reps  Supine Dead Bug with Leg Extension - 1 x daily - 7 x weekly - 2 sets - 5-10 reps  Thoracic Extension Mobilization on Foam Roll - 1 x daily - 7 x weekly - 1 sets - 5 reps  Gentle Levator Scapulae Stretch - 1 x daily - 7 x weekly - 1 sets - 3 reps - 10-20 sec hold  Seated Gentle Upper Trapezius Stretch - 1 x daily - 7 x weekly - 1 sets - 3 reps  Standing with Forearms Thoracic Rotation - 1 x daily - 7 x weekly - 1 sets - 10 reps  Seated Assisted Cervical Rotation with Towel - 1 x daily - 7 x weekly - 1 sets - 10 reps        Charges: therapeutic exercise x2, man ther x1      Total Timed Treatment: 45 min  Total Treatment Time: 45 min

## 2022-09-14 ENCOUNTER — PATIENT MESSAGE (OUTPATIENT)
Dept: FAMILY MEDICINE CLINIC | Facility: CLINIC | Age: 67
End: 2022-09-14

## 2022-09-15 ENCOUNTER — OFFICE VISIT (OUTPATIENT)
Dept: FAMILY MEDICINE CLINIC | Facility: CLINIC | Age: 67
End: 2022-09-15
Payer: MEDICARE

## 2022-09-15 VITALS
SYSTOLIC BLOOD PRESSURE: 142 MMHG | HEIGHT: 67.5 IN | RESPIRATION RATE: 14 BRPM | WEIGHT: 173 LBS | TEMPERATURE: 99 F | DIASTOLIC BLOOD PRESSURE: 78 MMHG | HEART RATE: 80 BPM | BODY MASS INDEX: 26.84 KG/M2 | OXYGEN SATURATION: 98 %

## 2022-09-15 DIAGNOSIS — J02.9 SORE THROAT: ICD-10-CM

## 2022-09-15 DIAGNOSIS — U07.1 COVID-19 VIRUS INFECTION: Primary | ICD-10-CM

## 2022-09-15 DIAGNOSIS — R05.1 ACUTE COUGH: ICD-10-CM

## 2022-09-15 LAB
CONTROL LINE PRESENT WITH A CLEAR BACKGROUND (YES/NO): YES YES/NO
KIT LOT #: 2554 NUMERIC
STREP GRP A CUL-SCR: NEGATIVE

## 2022-09-15 PROCEDURE — 87880 STREP A ASSAY W/OPTIC: CPT | Performed by: FAMILY MEDICINE

## 2022-09-15 PROCEDURE — 99213 OFFICE O/P EST LOW 20 MIN: CPT | Performed by: FAMILY MEDICINE

## 2022-09-15 RX ORDER — BENZONATATE 200 MG/1
200 CAPSULE ORAL 3 TIMES DAILY PRN
Qty: 20 CAPSULE | Refills: 0 | Status: SHIPPED | OUTPATIENT
Start: 2022-09-15

## 2022-09-15 NOTE — TELEPHONE ENCOUNTER
Pt states sore throat is now much worse and has higher temp 101. 6. He was told to call back if symptoms got worse. Please advise. Thank you.

## 2022-09-15 NOTE — TELEPHONE ENCOUNTER
Lm on vm to  and [facility] sent to pt.   [place] Yoo since pt's symptoms worsen with fever to IC for further eval.

## 2022-09-28 ENCOUNTER — OFFICE VISIT (OUTPATIENT)
Dept: SLEEP CENTER | Age: 67
End: 2022-09-28
Attending: FAMILY MEDICINE

## 2022-09-28 DIAGNOSIS — R06.83 SNORING: ICD-10-CM

## 2022-09-28 DIAGNOSIS — I25.10 CORONARY ARTERY DISEASE INVOLVING NATIVE CORONARY ARTERY OF NATIVE HEART WITHOUT ANGINA PECTORIS: ICD-10-CM

## 2022-09-28 PROCEDURE — 95810 POLYSOM 6/> YRS 4/> PARAM: CPT

## 2022-10-10 ENCOUNTER — OFFICE VISIT (OUTPATIENT)
Dept: FAMILY MEDICINE CLINIC | Facility: CLINIC | Age: 67
End: 2022-10-10
Payer: MEDICARE

## 2022-10-10 VITALS
TEMPERATURE: 99 F | DIASTOLIC BLOOD PRESSURE: 85 MMHG | OXYGEN SATURATION: 99 % | RESPIRATION RATE: 18 BRPM | WEIGHT: 171.81 LBS | BODY MASS INDEX: 26.04 KG/M2 | HEART RATE: 70 BPM | HEIGHT: 68 IN | SYSTOLIC BLOOD PRESSURE: 140 MMHG

## 2022-10-10 DIAGNOSIS — H61.23 BILATERAL IMPACTED CERUMEN: Primary | ICD-10-CM

## 2022-10-11 ENCOUNTER — SLEEP STUDY (OUTPATIENT)
Facility: CLINIC | Age: 67
End: 2022-10-11
Payer: MEDICARE

## 2022-10-11 DIAGNOSIS — G47.33 OSA (OBSTRUCTIVE SLEEP APNEA): Primary | ICD-10-CM

## 2022-10-31 ENCOUNTER — LAB ENCOUNTER (OUTPATIENT)
Dept: LAB | Age: 67
End: 2022-10-31
Attending: FAMILY MEDICINE
Payer: MEDICARE

## 2022-10-31 DIAGNOSIS — Z01.818 PRE-OP TESTING: ICD-10-CM

## 2022-10-31 DIAGNOSIS — Z01.818 PREOPERATIVE EXAMINATION, UNSPECIFIED: ICD-10-CM

## 2022-10-31 DIAGNOSIS — Z11.59 SCREENING FOR VIRAL DISEASE: ICD-10-CM

## 2022-11-01 LAB — SARS-COV-2 RNA RESP QL NAA+PROBE: NOT DETECTED

## 2022-11-03 ENCOUNTER — OFFICE VISIT (OUTPATIENT)
Dept: SLEEP CENTER | Age: 67
End: 2022-11-03
Attending: Other
Payer: MEDICARE

## 2022-11-03 DIAGNOSIS — I25.10 CORONARY ARTERY DISEASE INVOLVING NATIVE CORONARY ARTERY OF NATIVE HEART WITHOUT ANGINA PECTORIS: ICD-10-CM

## 2022-11-03 DIAGNOSIS — R06.83 SNORING: ICD-10-CM

## 2022-11-03 PROCEDURE — 95811 POLYSOM 6/>YRS CPAP 4/> PARM: CPT

## 2022-11-05 ENCOUNTER — MED REC SCAN ONLY (OUTPATIENT)
Dept: FAMILY MEDICINE CLINIC | Facility: CLINIC | Age: 67
End: 2022-11-05

## 2022-11-17 ENCOUNTER — SLEEP STUDY (OUTPATIENT)
Facility: CLINIC | Age: 67
End: 2022-11-17
Payer: MEDICARE

## 2022-11-17 DIAGNOSIS — G47.33 OBSTRUCTIVE SLEEP APNEA SYNDROME: Primary | ICD-10-CM

## 2022-11-17 PROCEDURE — 95811 POLYSOM 6/>YRS CPAP 4/> PARM: CPT | Performed by: OTHER

## 2022-11-22 ENCOUNTER — TELEPHONE (OUTPATIENT)
Facility: CLINIC | Age: 67
End: 2022-11-22

## 2022-11-22 DIAGNOSIS — G47.33 OSA (OBSTRUCTIVE SLEEP APNEA): Primary | ICD-10-CM

## 2022-11-22 NOTE — TELEPHONE ENCOUNTER
Pt notified cpap machine ordered to [facility]. DME will verify insurance and once approved will contact pt to arrange delivery and instructions. Pt instructed to follow up with Dr. Damien Hair once pt starts PAP therapy per insurance compliance requirement. Pt verbalized understanding of instructions and agrees with the plan.

## 2023-01-17 ENCOUNTER — TELEMEDICINE (OUTPATIENT)
Facility: CLINIC | Age: 68
End: 2023-01-17

## 2023-01-17 DIAGNOSIS — G47.33 OBSTRUCTIVE SLEEP APNEA: Primary | ICD-10-CM

## 2023-01-17 DIAGNOSIS — G47.10 HYPERSOMNIA: ICD-10-CM

## 2023-01-17 PROCEDURE — 99204 OFFICE O/P NEW MOD 45 MIN: CPT | Performed by: OTHER

## 2023-01-17 NOTE — PATIENT INSTRUCTIONS
Please be advised:   Do not drive while sleepy   Take CPAP/BiPAP machine to any procedure that requires sedation     When should I clean my machine & supplies? Clean mask cushions or nasal pillow, headgear, tubing, and humidifier chamber with mild soap (Jes) and water   If water chamber has hard water buildup (white crust), soak in warm water & vinegar mix 50/50. Rinse and hang dry     DAILY   Wipe mask cushions or nasal pillow   Empty & rinse water chamber- refill with distilled water     WEEKLY   Clean mask cushions or nasal pillow, headgear, tubing, and humidifier chamber with mild soap (Jes) and water   If water chamber has hard water buildup (white crust), soak in warm water & vinegar mix 50/50. Rinse and hang dry     When should supplies be replaced? Contact your home care company for replacement supplies, or if your machine is malfunctioning   *Below is a general guideline of what we recommend. Replacement of supplies differs depending on your insurance company*   MONTHLY: Replace filter and mask cushion   6 MONTHS: Replace headgear and tubing     Travel Tips   Keep CPAP/BiPAP in original bag when traveling, and place luggage tag on bag   Most airlines consider CPAP/BiPAP to be a medical device, therefore it is a free carry-on item   If unable to get distilled water, bottled water is safe while traveling.  DO NOT use tap water   When traveling outside the [place] only a power adapter is necessary (CPAP can operate without a converter), bring an extension cord   Consider purchasing or renting a travel CPAP (not covered by insurance)     Dry Mouth/Nose   Turn up the humidity on your machine (select \"Options\" from the home screen)   Place a cool mist humidifier at your bedside   Over-the-counter remedies: Biotene, XyliMelts, NasoGel     Air Leak   Try adjusting your mask/headgear while laying in sleeping position vs. sitting up   Wash and dry your face prior to putting your mask on   If applicable: shave facial hair at night (or before wearing CPAP)   Purchase \"RemZzzs\" (through home care co., [email or web address], or remzzzs. com)   100% cotton knit barrier that goes between your mask cushion and your skin   Replace your mask cushion (at least once per month) and/or headgear (every 3-6 months)     Nasal Congestion   CPAP therapy can cause nasal passages to dry out, & mucous membranes try to protect the nasal passages by producing excess mucous, so congestion results. Over-the counter remedies: Flonase, Nasacort, Sinus Rinses (Neti-Pot), DO NOT USE Afrin   Try a mask that goes over the nose and mouth     Skin Irritation   Clean supplies regularly (Citrus II Mask Wipes, Control III disinfectant solution)   Try over the counter creams such as hydrocortisone 1% (apply in the morning after showering)   Your headgear may be too tight, replace supplies so you don't need to adjust so tightly   Try RemZzzs (100% cotton knit barrier that goes between your mask cushion and your skin)     Gas/Bloating   Try a different sleeping position to keep air out of the stomach. Lay on the left side or rotate to the right side. Incline with pillows or lay flat.    Over-the-counter remedies: Simethicone

## 2023-01-20 ENCOUNTER — MED REC SCAN ONLY (OUTPATIENT)
Dept: FAMILY MEDICINE CLINIC | Facility: CLINIC | Age: 68
End: 2023-01-20

## 2023-03-01 ENCOUNTER — LAB ENCOUNTER (OUTPATIENT)
Dept: LAB | Age: 68
End: 2023-03-01
Attending: INTERNAL MEDICINE
Payer: MEDICARE

## 2023-03-01 DIAGNOSIS — I65.23 ATHEROSCLEROSIS OF BOTH CAROTID ARTERIES: Primary | ICD-10-CM

## 2023-03-01 LAB
CHOLEST SERPL-MCNC: 183 MG/DL (ref ?–200)
FASTING PATIENT LIPID ANSWER: YES
HDLC SERPL-MCNC: 50 MG/DL (ref 40–59)
LDLC SERPL CALC-MCNC: 111 MG/DL (ref ?–100)
NONHDLC SERPL-MCNC: 133 MG/DL (ref ?–130)
TRIGL SERPL-MCNC: 125 MG/DL (ref 30–149)
VLDLC SERPL CALC-MCNC: 21 MG/DL (ref 0–30)

## 2023-03-01 PROCEDURE — 80061 LIPID PANEL: CPT

## 2023-03-01 PROCEDURE — 36415 COLL VENOUS BLD VENIPUNCTURE: CPT

## 2023-03-08 ENCOUNTER — MED REC SCAN ONLY (OUTPATIENT)
Facility: CLINIC | Age: 68
End: 2023-03-08

## 2023-03-13 ENCOUNTER — MED REC SCAN ONLY (OUTPATIENT)
Dept: FAMILY MEDICINE CLINIC | Facility: CLINIC | Age: 68
End: 2023-03-13

## 2023-03-27 ENCOUNTER — HOSPITAL ENCOUNTER (EMERGENCY)
Facility: HOSPITAL | Age: 68
Discharge: HOME | End: 2023-03-27
Attending: EMERGENCY MEDICINE
Payer: MEDICARE

## 2023-03-27 ENCOUNTER — APPOINTMENT (OUTPATIENT)
Dept: GENERAL RADIOLOGY | Facility: HOSPITAL | Age: 68
End: 2023-03-27
Payer: MEDICARE

## 2023-03-27 VITALS
DIASTOLIC BLOOD PRESSURE: 92 MMHG | OXYGEN SATURATION: 96 % | HEIGHT: 68 IN | SYSTOLIC BLOOD PRESSURE: 154 MMHG | BODY MASS INDEX: 26.52 KG/M2 | HEART RATE: 68 BPM | TEMPERATURE: 98 F | RESPIRATION RATE: 12 BRPM | WEIGHT: 175 LBS

## 2023-03-27 DIAGNOSIS — R07.89 CHEST PAIN, ATYPICAL: ICD-10-CM

## 2023-03-27 DIAGNOSIS — I15.9 SECONDARY HYPERTENSION: Primary | ICD-10-CM

## 2023-03-27 LAB
ALBUMIN SERPL-MCNC: 3.8 G/DL (ref 3.4–5)
ALBUMIN/GLOB SERPL: 1.1 {RATIO} (ref 1–2)
ALP LIVER SERPL-CCNC: 34 U/L
ALT SERPL-CCNC: 44 U/L
ANION GAP SERPL CALC-SCNC: 4 MMOL/L (ref 0–18)
AST SERPL-CCNC: 21 U/L (ref 15–37)
BASOPHILS # BLD AUTO: 0.02 X10(3) UL (ref 0–0.2)
BASOPHILS NFR BLD AUTO: 0.5 %
BILIRUB SERPL-MCNC: 0.9 MG/DL (ref 0.1–2)
BUN BLD-MCNC: 11 MG/DL (ref 7–18)
CALCIUM BLD-MCNC: 9.3 MG/DL (ref 8.5–10.1)
CHLORIDE SERPL-SCNC: 108 MMOL/L (ref 98–112)
CO2 SERPL-SCNC: 25 MMOL/L (ref 21–32)
CREAT BLD-MCNC: 0.94 MG/DL
EOSINOPHIL # BLD AUTO: 0.05 X10(3) UL (ref 0–0.7)
EOSINOPHIL NFR BLD AUTO: 1.4 %
ERYTHROCYTE [DISTWIDTH] IN BLOOD BY AUTOMATED COUNT: 13.5 %
GFR SERPLBLD BASED ON 1.73 SQ M-ARVRAT: 89 ML/MIN/1.73M2 (ref 60–?)
GLOBULIN PLAS-MCNC: 3.4 G/DL (ref 2.8–4.4)
GLUCOSE BLD-MCNC: 161 MG/DL (ref 70–99)
HCT VFR BLD AUTO: 41.3 %
HGB BLD-MCNC: 13.8 G/DL
IMM GRANULOCYTES # BLD AUTO: 0.01 X10(3) UL (ref 0–1)
IMM GRANULOCYTES NFR BLD: 0.3 %
LYMPHOCYTES # BLD AUTO: 0.63 X10(3) UL (ref 1–4)
LYMPHOCYTES NFR BLD AUTO: 17.2 %
MCH RBC QN AUTO: 29.7 PG (ref 26–34)
MCHC RBC AUTO-ENTMCNC: 33.4 G/DL (ref 31–37)
MCV RBC AUTO: 88.8 FL
MONOCYTES # BLD AUTO: 0.39 X10(3) UL (ref 0.1–1)
MONOCYTES NFR BLD AUTO: 10.6 %
NEUTROPHILS # BLD AUTO: 2.57 X10 (3) UL (ref 1.5–7.7)
NEUTROPHILS # BLD AUTO: 2.57 X10(3) UL (ref 1.5–7.7)
NEUTROPHILS NFR BLD AUTO: 70 %
OSMOLALITY SERPL CALC.SUM OF ELEC: 287 MOSM/KG (ref 275–295)
PLATELET # BLD AUTO: 161 10(3)UL (ref 150–450)
POTASSIUM SERPL-SCNC: 4 MMOL/L (ref 3.5–5.1)
PROT SERPL-MCNC: 7.2 G/DL (ref 6.4–8.2)
RBC # BLD AUTO: 4.65 X10(6)UL
SARS-COV-2 RNA RESP QL NAA+PROBE: NOT DETECTED
SODIUM SERPL-SCNC: 137 MMOL/L (ref 136–145)
TROPONIN I HIGH SENSITIVITY: 11 NG/L
TROPONIN I HIGH SENSITIVITY: 8 NG/L
WBC # BLD AUTO: 3.7 X10(3) UL (ref 4–11)

## 2023-03-27 PROCEDURE — 84484 ASSAY OF TROPONIN QUANT: CPT | Performed by: EMERGENCY MEDICINE

## 2023-03-27 PROCEDURE — 99284 EMERGENCY DEPT VISIT MOD MDM: CPT

## 2023-03-27 PROCEDURE — 80053 COMPREHEN METABOLIC PANEL: CPT

## 2023-03-27 PROCEDURE — 93005 ELECTROCARDIOGRAM TRACING: CPT

## 2023-03-27 PROCEDURE — 99285 EMERGENCY DEPT VISIT HI MDM: CPT

## 2023-03-27 PROCEDURE — 36415 COLL VENOUS BLD VENIPUNCTURE: CPT

## 2023-03-27 PROCEDURE — 80053 COMPREHEN METABOLIC PANEL: CPT | Performed by: EMERGENCY MEDICINE

## 2023-03-27 PROCEDURE — 84484 ASSAY OF TROPONIN QUANT: CPT

## 2023-03-27 PROCEDURE — 85025 COMPLETE CBC W/AUTO DIFF WBC: CPT

## 2023-03-27 PROCEDURE — 85025 COMPLETE CBC W/AUTO DIFF WBC: CPT | Performed by: EMERGENCY MEDICINE

## 2023-03-27 PROCEDURE — 71045 X-RAY EXAM CHEST 1 VIEW: CPT | Performed by: EMERGENCY MEDICINE

## 2023-03-27 PROCEDURE — 93010 ELECTROCARDIOGRAM REPORT: CPT

## 2023-03-27 RX ORDER — NITROGLYCERIN 0.4 MG/1
0.4 TABLET SUBLINGUAL ONCE
Status: COMPLETED | OUTPATIENT
Start: 2023-03-27 | End: 2023-03-27

## 2023-03-27 RX ORDER — ASPIRIN 81 MG/1
162 TABLET, CHEWABLE ORAL ONCE
Status: COMPLETED | OUTPATIENT
Start: 2023-03-27 | End: 2023-03-27

## 2023-03-27 RX ORDER — LISINOPRIL 20 MG/1
20 TABLET ORAL DAILY
Qty: 30 TABLET | Refills: 0 | Status: SHIPPED | OUTPATIENT
Start: 2023-03-27 | End: 2023-04-26

## 2023-03-27 NOTE — DISCHARGE INSTRUCTIONS
Follow-up for further evaluation primary physician and cardiology as discussed. Return if shortness of breath, diaphoresis, increased chest pain, new or worse symptoms. Continue present medications except increase lisinopril to 20 mg daily. Record blood pressure twice daily for follow-up with your doctor.

## 2023-03-27 NOTE — ED INITIAL ASSESSMENT (HPI)
Pt c/o left sided shoulder pain that radiates into the chest since yesterday. Pt states this morning \"feeling off\" took his BP at the time and was high 220/110. Pt has hx of cardiac stent.

## 2023-03-28 LAB
ATRIAL RATE: 70 BPM
ATRIAL RATE: 76 BPM
P AXIS: 51 DEGREES
P AXIS: 56 DEGREES
P-R INTERVAL: 172 MS
P-R INTERVAL: 176 MS
Q-T INTERVAL: 370 MS
Q-T INTERVAL: 400 MS
QRS DURATION: 104 MS
QRS DURATION: 108 MS
QTC CALCULATION (BEZET): 416 MS
QTC CALCULATION (BEZET): 432 MS
R AXIS: -22 DEGREES
R AXIS: -25 DEGREES
T AXIS: 31 DEGREES
T AXIS: 9 DEGREES
VENTRICULAR RATE: 70 BPM
VENTRICULAR RATE: 76 BPM

## 2023-04-11 ENCOUNTER — MED REC SCAN ONLY (OUTPATIENT)
Dept: FAMILY MEDICINE CLINIC | Facility: CLINIC | Age: 68
End: 2023-04-11

## 2023-04-26 ENCOUNTER — MED REC SCAN ONLY (OUTPATIENT)
Facility: CLINIC | Age: 68
End: 2023-04-26

## 2023-05-08 ENCOUNTER — LAB ENCOUNTER (OUTPATIENT)
Dept: LAB | Age: 68
End: 2023-05-08
Attending: NURSE PRACTITIONER
Payer: MEDICARE

## 2023-05-08 DIAGNOSIS — Z01.818 PRE-OP TESTING: ICD-10-CM

## 2023-05-08 LAB
ANION GAP SERPL CALC-SCNC: 3 MMOL/L (ref 0–18)
BASOPHILS # BLD AUTO: 0.03 X10(3) UL (ref 0–0.2)
BASOPHILS NFR BLD AUTO: 0.6 %
BUN BLD-MCNC: 17 MG/DL (ref 7–18)
CALCIUM BLD-MCNC: 9.1 MG/DL (ref 8.5–10.1)
CHLORIDE SERPL-SCNC: 107 MMOL/L (ref 98–112)
CO2 SERPL-SCNC: 27 MMOL/L (ref 21–32)
CREAT BLD-MCNC: 1.02 MG/DL
EOSINOPHIL # BLD AUTO: 0.16 X10(3) UL (ref 0–0.7)
EOSINOPHIL NFR BLD AUTO: 3.4 %
ERYTHROCYTE [DISTWIDTH] IN BLOOD BY AUTOMATED COUNT: 14.6 %
FASTING STATUS PATIENT QL REPORTED: YES
GFR SERPLBLD BASED ON 1.73 SQ M-ARVRAT: 81 ML/MIN/1.73M2 (ref 60–?)
GLUCOSE BLD-MCNC: 110 MG/DL (ref 70–99)
HCT VFR BLD AUTO: 39.5 %
HGB BLD-MCNC: 12.7 G/DL
IMM GRANULOCYTES # BLD AUTO: 0.01 X10(3) UL (ref 0–1)
IMM GRANULOCYTES NFR BLD: 0.2 %
LYMPHOCYTES # BLD AUTO: 1.13 X10(3) UL (ref 1–4)
LYMPHOCYTES NFR BLD AUTO: 24.3 %
MCH RBC QN AUTO: 30 PG (ref 26–34)
MCHC RBC AUTO-ENTMCNC: 32.2 G/DL (ref 31–37)
MCV RBC AUTO: 93.4 FL
MONOCYTES # BLD AUTO: 0.6 X10(3) UL (ref 0.1–1)
MONOCYTES NFR BLD AUTO: 12.9 %
NEUTROPHILS # BLD AUTO: 2.72 X10 (3) UL (ref 1.5–7.7)
NEUTROPHILS # BLD AUTO: 2.72 X10(3) UL (ref 1.5–7.7)
NEUTROPHILS NFR BLD AUTO: 58.6 %
OSMOLALITY SERPL CALC.SUM OF ELEC: 286 MOSM/KG (ref 275–295)
PLATELET # BLD AUTO: 176 10(3)UL (ref 150–450)
POTASSIUM SERPL-SCNC: 4.2 MMOL/L (ref 3.5–5.1)
RBC # BLD AUTO: 4.23 X10(6)UL
SODIUM SERPL-SCNC: 137 MMOL/L (ref 136–145)
WBC # BLD AUTO: 4.7 X10(3) UL (ref 4–11)

## 2023-05-08 PROCEDURE — 80048 BASIC METABOLIC PNL TOTAL CA: CPT

## 2023-05-08 PROCEDURE — 36415 COLL VENOUS BLD VENIPUNCTURE: CPT

## 2023-05-08 PROCEDURE — 85025 COMPLETE CBC W/AUTO DIFF WBC: CPT

## 2023-05-10 ENCOUNTER — HOSPITAL ENCOUNTER (OUTPATIENT)
Dept: INTERVENTIONAL RADIOLOGY/VASCULAR | Facility: HOSPITAL | Age: 68
Discharge: HOME | End: 2023-05-10
Attending: INTERNAL MEDICINE | Admitting: INTERNAL MEDICINE
Payer: MEDICARE

## 2023-05-10 VITALS
OXYGEN SATURATION: 99 % | DIASTOLIC BLOOD PRESSURE: 64 MMHG | SYSTOLIC BLOOD PRESSURE: 119 MMHG | HEART RATE: 59 BPM | RESPIRATION RATE: 15 BRPM

## 2023-05-10 DIAGNOSIS — I25.10 CAD (CORONARY ARTERY DISEASE): ICD-10-CM

## 2023-05-10 DIAGNOSIS — R94.39 ABNORMAL STRESS TEST: ICD-10-CM

## 2023-05-10 PROCEDURE — 99152 MOD SED SAME PHYS/QHP 5/>YRS: CPT | Performed by: INTERNAL MEDICINE

## 2023-05-10 PROCEDURE — B2111ZZ FLUOROSCOPY OF MULTIPLE CORONARY ARTERIES USING LOW OSMOLAR CONTRAST: ICD-10-PCS | Performed by: INTERNAL MEDICINE

## 2023-05-10 PROCEDURE — B2151ZZ FLUOROSCOPY OF LEFT HEART USING LOW OSMOLAR CONTRAST: ICD-10-PCS | Performed by: INTERNAL MEDICINE

## 2023-05-10 PROCEDURE — 93458 L HRT ARTERY/VENTRICLE ANGIO: CPT | Performed by: INTERNAL MEDICINE

## 2023-05-10 RX ORDER — LIDOCAINE HYDROCHLORIDE 10 MG/ML
INJECTION, SOLUTION EPIDURAL; INFILTRATION; INTRACAUDAL; PERINEURAL
Status: COMPLETED
Start: 2023-05-10 | End: 2023-05-10

## 2023-05-10 RX ORDER — MIDAZOLAM HYDROCHLORIDE 1 MG/ML
INJECTION INTRAMUSCULAR; INTRAVENOUS
Status: COMPLETED
Start: 2023-05-10 | End: 2023-05-10

## 2023-05-10 RX ORDER — SODIUM CHLORIDE 9 MG/ML
INJECTION, SOLUTION INTRAVENOUS
Status: COMPLETED | OUTPATIENT
Start: 2023-05-11 | End: 2023-05-10

## 2023-05-10 RX ORDER — ASPIRIN 81 MG/1
81 TABLET, CHEWABLE ORAL ONCE
Status: DISCONTINUED | OUTPATIENT
Start: 2023-05-10 | End: 2023-05-10 | Stop reason: HOSPADM

## 2023-05-10 RX ORDER — HEPARIN SODIUM 5000 [USP'U]/ML
INJECTION, SOLUTION INTRAVENOUS; SUBCUTANEOUS
Status: COMPLETED
Start: 2023-05-10 | End: 2023-05-10

## 2023-05-10 RX ADMIN — SODIUM CHLORIDE: 9 INJECTION, SOLUTION INTRAVENOUS at 07:51:00

## 2023-05-10 NOTE — PROGRESS NOTES
0900- pt post C. Pt sleepy but arousable. Vss. Right fem access site is soft, drsg is CDI. Recovery complete per protocol. Vss. Pt has been sitting up in bed, voided and walked. Right fem site remains soft with no signs of bleeding or hematoma. Discharge instructions reviewed, iv dc'd and pt discharged home with wife driving.

## 2023-05-17 ENCOUNTER — MED REC SCAN ONLY (OUTPATIENT)
Facility: CLINIC | Age: 68
End: 2023-05-17

## 2023-05-17 NOTE — PROCEDURES
[facility]    PATIENT'S NAME: Antione Julian   ATTENDING PHYSICIAN: Gabbi Magdaleno M.D. OPERATING PHYSICIAN: Gabbi Magdaleno M.D. PATIENT ACCOUNT#:   [de-identified]    LOCATION:  [address]  MEDICAL RECORD #:   [id number]       YOB: 1955  ADMISSION DATE:       05/10/2023      OPERATION DATE:  05/10/2023    CARDIAC PROCEDURE TRANSCRIPTION    CARDIAC CATHETERIZATION     PREOPERATIVE DIAGNOSIS:    POSTOPERATIVE DIAGNOSIS:    PROCEDURE PERFORMED:      DESCRIPTION OF PROCEDURE:  The right groin was anesthetized. A 6-[place] sheath was introduced. LV angiogram revealed mild apical hypokinesis, with ejection fraction of 50%. Left coronary arteriography:  The stent to the LAD is widely patent. There is about a 40% stenosis in the circumflex. There is some narrowing of the ostium of the LAD before the stent of about 30% to 40%. Right coronary arteriography:  Right coronary artery has a 20% proximal stenosis. IMPRESSION:    1. Mild anterior apical hypokinesis. 2.   Patent stent to the left anterior descending, with a 30% to 40% ostial left anterior descending. 3.   A 30% circumflex. 4.   Mild disease in the right.     Dictated By Gabbi Magdaleno M.D.  d: 05/16/2023 13:08:18  t: 05/16/2023 19:48:04  Job [id number]  PATRICK/

## 2023-07-24 ENCOUNTER — LAB ENCOUNTER (OUTPATIENT)
Dept: LAB | Age: 68
End: 2023-07-24
Attending: NURSE PRACTITIONER
Payer: MEDICARE

## 2023-07-24 DIAGNOSIS — I10 ESSENTIAL HYPERTENSION, MALIGNANT: ICD-10-CM

## 2023-07-24 DIAGNOSIS — I34.0 MITRAL INCOMPETENCE: ICD-10-CM

## 2023-07-24 DIAGNOSIS — I65.23 BILATERAL CAROTID ARTERY STENOSIS: Primary | ICD-10-CM

## 2023-07-24 LAB — CK SERPL-CCNC: 184 U/L

## 2023-07-24 PROCEDURE — 36415 COLL VENOUS BLD VENIPUNCTURE: CPT

## 2023-07-24 PROCEDURE — 82550 ASSAY OF CK (CPK): CPT

## 2023-07-26 ENCOUNTER — LAB ENCOUNTER (OUTPATIENT)
Dept: LAB | Age: 68
End: 2023-07-26
Attending: INTERNAL MEDICINE
Payer: MEDICARE

## 2023-07-26 DIAGNOSIS — I65.23 BILATERAL CAROTID ARTERY STENOSIS: Primary | ICD-10-CM

## 2023-07-26 DIAGNOSIS — I25.10 CORONARY ATHEROSCLEROSIS OF NATIVE CORONARY ARTERY: ICD-10-CM

## 2023-07-26 DIAGNOSIS — I77.1 STRICTURE OF ARTERY (HCC): ICD-10-CM

## 2023-07-26 LAB
ALT SERPL-CCNC: 42 U/L
AST SERPL-CCNC: 29 U/L (ref 15–37)
CHOLEST SERPL-MCNC: 154 MG/DL (ref ?–200)
FASTING PATIENT LIPID ANSWER: YES
HDLC SERPL-MCNC: 57 MG/DL (ref 40–59)
LDLC SERPL CALC-MCNC: 79 MG/DL (ref ?–100)
NONHDLC SERPL-MCNC: 97 MG/DL (ref ?–130)
TRIGL SERPL-MCNC: 97 MG/DL (ref 30–149)
VLDLC SERPL CALC-MCNC: 15 MG/DL (ref 0–30)

## 2023-07-26 PROCEDURE — 80061 LIPID PANEL: CPT

## 2023-07-26 PROCEDURE — 84460 ALANINE AMINO (ALT) (SGPT): CPT

## 2023-07-26 PROCEDURE — 36415 COLL VENOUS BLD VENIPUNCTURE: CPT

## 2023-07-26 PROCEDURE — 84450 TRANSFERASE (AST) (SGOT): CPT

## 2023-08-15 ENCOUNTER — MED REC SCAN ONLY (OUTPATIENT)
Dept: FAMILY MEDICINE CLINIC | Facility: CLINIC | Age: 68
End: 2023-08-15

## 2023-09-05 ENCOUNTER — LAB ENCOUNTER (OUTPATIENT)
Dept: LAB | Age: 68
End: 2023-09-05
Attending: FAMILY MEDICINE
Payer: MEDICARE

## 2023-09-05 ENCOUNTER — OFFICE VISIT (OUTPATIENT)
Dept: FAMILY MEDICINE CLINIC | Facility: CLINIC | Age: 68
End: 2023-09-05
Payer: MEDICARE

## 2023-09-05 VITALS
RESPIRATION RATE: 16 BRPM | SYSTOLIC BLOOD PRESSURE: 126 MMHG | WEIGHT: 181 LBS | HEIGHT: 68 IN | DIASTOLIC BLOOD PRESSURE: 76 MMHG | HEART RATE: 76 BPM | TEMPERATURE: 98 F | BODY MASS INDEX: 27.43 KG/M2

## 2023-09-05 DIAGNOSIS — I77.1 TORTUOUS AORTA (HCC): ICD-10-CM

## 2023-09-05 DIAGNOSIS — I10 BENIGN ESSENTIAL HYPERTENSION: ICD-10-CM

## 2023-09-05 DIAGNOSIS — R73.09 ELEVATED GLUCOSE: ICD-10-CM

## 2023-09-05 DIAGNOSIS — Z00.00 ENCOUNTER FOR ANNUAL HEALTH EXAMINATION: Primary | ICD-10-CM

## 2023-09-05 LAB
BASOPHILS # BLD AUTO: 0.03 X10(3) UL (ref 0–0.2)
BASOPHILS NFR BLD AUTO: 0.6 %
BILIRUB UR QL STRIP.AUTO: NEGATIVE
CLARITY UR REFRACT.AUTO: CLEAR
EOSINOPHIL # BLD AUTO: 0.1 X10(3) UL (ref 0–0.7)
EOSINOPHIL NFR BLD AUTO: 1.9 %
ERYTHROCYTE [DISTWIDTH] IN BLOOD BY AUTOMATED COUNT: 14.3 %
EST. AVERAGE GLUCOSE BLD GHB EST-MCNC: 126 MG/DL (ref 68–126)
GLUCOSE UR STRIP.AUTO-MCNC: NORMAL MG/DL
HBA1C MFR BLD: 6 % (ref ?–5.7)
HCT VFR BLD AUTO: 42 %
HGB BLD-MCNC: 13.7 G/DL
IMM GRANULOCYTES # BLD AUTO: 0.02 X10(3) UL (ref 0–1)
IMM GRANULOCYTES NFR BLD: 0.4 %
KETONES UR STRIP.AUTO-MCNC: NEGATIVE MG/DL
LEUKOCYTE ESTERASE UR QL STRIP.AUTO: NEGATIVE
LYMPHOCYTES # BLD AUTO: 1.12 X10(3) UL (ref 1–4)
LYMPHOCYTES NFR BLD AUTO: 21.4 %
MCH RBC QN AUTO: 29.9 PG (ref 26–34)
MCHC RBC AUTO-ENTMCNC: 32.6 G/DL (ref 31–37)
MCV RBC AUTO: 91.7 FL
MONOCYTES # BLD AUTO: 0.67 X10(3) UL (ref 0.1–1)
MONOCYTES NFR BLD AUTO: 12.8 %
NEUTROPHILS # BLD AUTO: 3.29 X10 (3) UL (ref 1.5–7.7)
NEUTROPHILS # BLD AUTO: 3.29 X10(3) UL (ref 1.5–7.7)
NEUTROPHILS NFR BLD AUTO: 62.9 %
NITRITE UR QL STRIP.AUTO: NEGATIVE
PH UR STRIP.AUTO: 7 [PH] (ref 5–8)
PLATELET # BLD AUTO: 162 10(3)UL (ref 150–450)
PROT UR STRIP.AUTO-MCNC: NEGATIVE MG/DL
RBC # BLD AUTO: 4.58 X10(6)UL
RBC UR QL AUTO: NEGATIVE
SP GR UR STRIP.AUTO: 1.01 (ref 1–1.03)
UROBILINOGEN UR STRIP.AUTO-MCNC: NORMAL MG/DL
WBC # BLD AUTO: 5.2 X10(3) UL (ref 4–11)

## 2023-09-05 PROCEDURE — 83036 HEMOGLOBIN GLYCOSYLATED A1C: CPT

## 2023-09-05 PROCEDURE — 81003 URINALYSIS AUTO W/O SCOPE: CPT

## 2023-09-05 PROCEDURE — 85025 COMPLETE CBC W/AUTO DIFF WBC: CPT

## 2023-09-05 PROCEDURE — 99213 OFFICE O/P EST LOW 20 MIN: CPT | Performed by: FAMILY MEDICINE

## 2023-09-05 PROCEDURE — G0438 PPPS, INITIAL VISIT: HCPCS | Performed by: FAMILY MEDICINE

## 2023-09-05 RX ORDER — LISINOPRIL 10 MG/1
10 TABLET ORAL DAILY
COMMUNITY

## 2023-09-05 RX ORDER — LISINOPRIL 10 MG/1
10 TABLET ORAL DAILY
Qty: 90 TABLET | Refills: 0 | Status: CANCELLED | OUTPATIENT
Start: 2023-09-05

## 2023-09-18 ENCOUNTER — LAB ENCOUNTER (OUTPATIENT)
Dept: LAB | Age: 68
End: 2023-09-18
Attending: NURSE PRACTITIONER
Payer: MEDICARE

## 2023-09-18 DIAGNOSIS — I10 ESSENTIAL HYPERTENSION, MALIGNANT: Primary | ICD-10-CM

## 2023-09-18 DIAGNOSIS — I25.10 CORONARY ATHEROSCLEROSIS OF NATIVE CORONARY ARTERY: ICD-10-CM

## 2023-09-18 DIAGNOSIS — I34.0 MITRAL INCOMPETENCE: ICD-10-CM

## 2023-09-18 LAB
ALT SERPL-CCNC: 38 U/L
AST SERPL-CCNC: 21 U/L (ref 15–37)
CHOLEST SERPL-MCNC: 146 MG/DL (ref ?–200)
CK SERPL-CCNC: 129 U/L
FASTING PATIENT LIPID ANSWER: YES
HDLC SERPL-MCNC: 60 MG/DL (ref 40–59)
LDLC SERPL CALC-MCNC: 70 MG/DL (ref ?–100)
NONHDLC SERPL-MCNC: 86 MG/DL (ref ?–130)
TRIGL SERPL-MCNC: 87 MG/DL (ref 30–149)
VLDLC SERPL CALC-MCNC: 13 MG/DL (ref 0–30)

## 2023-09-18 PROCEDURE — 82550 ASSAY OF CK (CPK): CPT

## 2023-09-18 PROCEDURE — 80061 LIPID PANEL: CPT

## 2023-09-18 PROCEDURE — 84460 ALANINE AMINO (ALT) (SGPT): CPT

## 2023-09-18 PROCEDURE — 84450 TRANSFERASE (AST) (SGOT): CPT

## 2023-09-18 PROCEDURE — 36415 COLL VENOUS BLD VENIPUNCTURE: CPT

## 2023-09-29 ENCOUNTER — MED REC SCAN ONLY (OUTPATIENT)
Dept: FAMILY MEDICINE CLINIC | Facility: CLINIC | Age: 68
End: 2023-09-29

## 2023-11-27 ENCOUNTER — MED REC SCAN ONLY (OUTPATIENT)
Facility: CLINIC | Age: 68
End: 2023-11-27

## 2023-12-29 ENCOUNTER — HOSPITAL ENCOUNTER (OUTPATIENT)
Age: 68
Discharge: HOME | End: 2023-12-29
Payer: MEDICARE

## 2023-12-29 VITALS
OXYGEN SATURATION: 98 % | HEART RATE: 68 BPM | RESPIRATION RATE: 16 BRPM | SYSTOLIC BLOOD PRESSURE: 145 MMHG | TEMPERATURE: 98 F | HEIGHT: 68 IN | BODY MASS INDEX: 26.52 KG/M2 | DIASTOLIC BLOOD PRESSURE: 97 MMHG | WEIGHT: 175 LBS

## 2023-12-29 DIAGNOSIS — J06.9 URI WITH COUGH AND CONGESTION: Primary | ICD-10-CM

## 2023-12-29 LAB
POCT INFLUENZA A: NEGATIVE
POCT INFLUENZA B: NEGATIVE
S PYO AG THROAT QL IA.RAPID: NEGATIVE
SARS-COV-2 RNA RESP QL NAA+PROBE: NOT DETECTED

## 2023-12-29 PROCEDURE — 99212 OFFICE O/P EST SF 10 MIN: CPT

## 2023-12-29 PROCEDURE — 87502 INFLUENZA DNA AMP PROBE: CPT | Performed by: EMERGENCY MEDICINE

## 2023-12-29 PROCEDURE — 87651 STREP A DNA AMP PROBE: CPT | Performed by: EMERGENCY MEDICINE

## 2023-12-29 PROCEDURE — 99213 OFFICE O/P EST LOW 20 MIN: CPT

## 2023-12-29 RX ORDER — LORATADINE 10 MG/1
10 TABLET ORAL DAILY
COMMUNITY

## 2024-01-04 ENCOUNTER — HOSPITAL ENCOUNTER (OUTPATIENT)
Age: 69
Discharge: HOME | End: 2024-01-04
Attending: EMERGENCY MEDICINE
Payer: MEDICARE

## 2024-01-04 VITALS
SYSTOLIC BLOOD PRESSURE: 145 MMHG | TEMPERATURE: 98 F | OXYGEN SATURATION: 97 % | RESPIRATION RATE: 18 BRPM | WEIGHT: 175 LBS | BODY MASS INDEX: 26.52 KG/M2 | HEART RATE: 73 BPM | HEIGHT: 68 IN | DIASTOLIC BLOOD PRESSURE: 86 MMHG

## 2024-01-04 DIAGNOSIS — B34.9 VIRAL SYNDROME: Primary | ICD-10-CM

## 2024-01-04 PROCEDURE — 99213 OFFICE O/P EST LOW 20 MIN: CPT

## 2024-01-04 RX ORDER — PREDNISONE 20 MG/1
40 TABLET ORAL DAILY
Qty: 10 TABLET | Refills: 0 | Status: SHIPPED | OUTPATIENT
Start: 2024-01-04 | End: 2024-01-09

## 2024-01-04 NOTE — ED INITIAL ASSESSMENT (HPI)
C/o nasal congestion for 1 week. Pt reports fever, chills and congested cough. Pt reports here last week and was negative for everything. Pt denies anyone sick or recent travel. Pt reports otc meds used.   
no

## 2024-01-04 NOTE — ED PROVIDER NOTES
Patient Seen in: [facility]      History     Chief Complaint   Patient presents with    Nasal Congestion     Stated Complaint: Cold    Subjective:   68-year-old male presents with sinus congestion, rhinorrhea, postnasal drip, myalgias.  Was seen here about a week ago for similar.  Tested negative for flu and COVID.  Also had outpatient RSV testing he states it was negative because he was supposed to his granddaughter with RSV.  States symptoms not really any better.  Still getting chills, still having postnasal drip.  No fevers.  No chest pain.  No shortness of breath.  No pleurisy or hemoptysis.  No chest pains.  No vomiting.  No neck rigidity.  No sinus pain.  No ear pain.            Objective:   Past Medical History:   Diagnosis Date    Cervicalgia     Chest pain, unspecified     Coronary atherosclerosis     High blood pressure     Hx of motion sickness     Sprain of metacarpophalangeal (joint) of hand     Sprain of neck     Sprain of thoracic region     Unspecified disorder of skin and subcutaneous tissue               Past Surgical History:   Procedure Laterality Date    COLONOSCOPY  03/13/2009    COLONOSCOPY N/A 3/29/2022    Procedure: COLONOSCOPY with cold snare polypectomy;  Surgeon: Eron Bell MD;  Location:  ENDOSCOPY    CORONARY STENT PLACEMENT  11/09/2020    CARDIAC CATHETERIZATION/PERCUTANEOUS CORONARY INTERVENTION     HEMORRHOIDECTOMY  10/2017    OTHER      [place] teeth    OTHER SURGICAL HISTORY  03/13/2009    Fiberoptic Examinations Gastroscopy                Social History     Socioeconomic History    Marital status:    Tobacco Use    Smoking status: Never    Smokeless tobacco: Never   Vaping Use    Vaping Use: Never used   Substance and Sexual Activity    Alcohol use: Yes     Alcohol/week: 1.0 - 2.0 standard drink of alcohol     Types: 1 - 2 Standard drinks or equivalent per week     Comment: social drinking    Drug use: No    Sexual activity: Yes     Partners:  Female   Other Topics Concern    Caffeine Concern Yes     Comment: 2 cups coffee daily    Exercise Yes     Comment: 3xwk    Seat Belt Yes   Social History Narrative    The patient does not use an assistive device..      The patient does live in a home with stairs.              Review of Systems   Constitutional:  Negative for fever.   HENT:  Positive for congestion and rhinorrhea. Negative for sore throat.    Respiratory:  Negative for shortness of breath.    Cardiovascular:  Negative for chest pain.   Gastrointestinal:  Negative for abdominal pain.   Neurological:  Negative for dizziness and headaches.       Positive for stated complaint: Cold  Other systems are as noted in HPI.  Constitutional and vital signs reviewed.      All other systems reviewed and negative except as noted above.    Physical Exam     ED Triage Vitals [01/04/24 1122]   /86   Pulse 73   Resp 18   Temp 97.7 °F (36.5 °C)   Temp src Temporal   SpO2 97 %   O2 Device None (Room air)       Current:/86   Pulse 73   Temp 97.7 °F (36.5 °C) (Temporal)   Resp 18   Ht 172.7 cm (5' 8\")   Wt 79.4 kg   SpO2 97%   BMI 26.61 kg/m²         Physical Exam  Vitals and nursing note reviewed.   Constitutional:       Appearance: He is not toxic-appearing.   HENT:      Head: Normocephalic.      Right Ear: Tympanic membrane normal.      Left Ear: Tympanic membrane normal.      Nose: Congestion and rhinorrhea present.      Mouth/Throat:      Pharynx: No oropharyngeal exudate or posterior oropharyngeal erythema.   Cardiovascular:      Rate and Rhythm: Normal rate and regular rhythm.      Pulses: Normal pulses.      Heart sounds: Normal heart sounds.   Pulmonary:      Effort: Pulmonary effort is normal. No respiratory distress.      Breath sounds: Normal breath sounds.   Abdominal:      Palpations: Abdomen is soft.   Musculoskeletal:         General: Normal range of motion.      Cervical back: Neck supple.   Skin:     General: Skin is warm and dry.    Neurological:      General: No focal deficit present.      Mental Status: He is alert and oriented to person, place, and time.      Cranial Nerves: No cranial nerve deficit.   Psychiatric:         Mood and Affect: Mood normal.         Behavior: Behavior normal.               ED Course   Labs Reviewed - No data to display                   MDM     68-year-old male presents with likely viral URI, some sinus congestion.  Some postnasal drip.  ENT exam is benign but some slight fluid in the left ear but no signs of otitis media.  Posterior oropharynx is clear.  Lungs are clear.  Pulses are equal.  No respiratory distress.  Very well-appearing and nontoxic.  Did offer consider and discussed imaging, blood work, reswabbing etc.  Did discuss sinobronchitis, viral syndrome.  Elect for steroids.  Supportive care otherwise for return precaution provided.  Discussed with the patient.  He is in agreement.  Discharge home at his request, shared decision making utilized                                   Medical Decision Making      Disposition and Plan     Clinical Impression:  1. Viral syndrome         Disposition:  Discharge  1/4/2024 11:45 am    Follow-up:  Amanda Abebe MD  [address] [place] [id number]  [phone]                Medications Prescribed:  Discharge Medication List as of 1/4/2024 11:46 AM        START taking these medications    Details   predniSONE 20 MG Oral Tab Take 2 tablets (40 mg total) by mouth daily for 5 days., Normal, Disp-10 tablet, R-0

## 2024-02-05 ENCOUNTER — MED REC SCAN ONLY (OUTPATIENT)
Dept: FAMILY MEDICINE CLINIC | Facility: CLINIC | Age: 69
End: 2024-02-05

## 2024-05-06 ENCOUNTER — LAB ENCOUNTER (OUTPATIENT)
Dept: LAB | Age: 69
End: 2024-05-06
Attending: INTERNAL MEDICINE
Payer: MEDICARE

## 2024-05-06 DIAGNOSIS — M25.512 PAIN IN LEFT SHOULDER: ICD-10-CM

## 2024-05-06 DIAGNOSIS — R06.09 DOE (DYSPNEA ON EXERTION): Primary | ICD-10-CM

## 2024-05-06 LAB
T4 FREE SERPL-MCNC: 0.9 NG/DL (ref 0.8–1.7)
TSI SER-ACNC: 1.75 MIU/ML (ref 0.36–3.74)

## 2024-05-06 PROCEDURE — 84439 ASSAY OF FREE THYROXINE: CPT

## 2024-05-06 PROCEDURE — 36415 COLL VENOUS BLD VENIPUNCTURE: CPT

## 2024-05-06 PROCEDURE — 84443 ASSAY THYROID STIM HORMONE: CPT

## 2024-05-08 ENCOUNTER — HOSPITAL ENCOUNTER (OUTPATIENT)
Dept: CV DIAGNOSTICS | Facility: HOSPITAL | Age: 69
Discharge: HOME | End: 2024-05-08
Attending: INTERNAL MEDICINE
Payer: MEDICARE

## 2024-05-08 DIAGNOSIS — M25.512 PAIN IN LEFT SHOULDER: ICD-10-CM

## 2024-05-08 DIAGNOSIS — I34.0 MITRAL VALVE INSUFFICIENCY: ICD-10-CM

## 2024-05-08 DIAGNOSIS — R06.09 DOE (DYSPNEA ON EXERTION): ICD-10-CM

## 2024-05-08 PROCEDURE — 93306 TTE W/DOPPLER COMPLETE: CPT | Performed by: INTERNAL MEDICINE

## 2024-05-30 ENCOUNTER — MED REC SCAN ONLY (OUTPATIENT)
Dept: FAMILY MEDICINE CLINIC | Facility: CLINIC | Age: 69
End: 2024-05-30

## 2024-10-24 ENCOUNTER — OFFICE VISIT (OUTPATIENT)
Dept: FAMILY MEDICINE CLINIC | Facility: CLINIC | Age: 69
End: 2024-10-24
Payer: MEDICARE

## 2024-10-24 ENCOUNTER — LAB ENCOUNTER (OUTPATIENT)
Dept: LAB | Age: 69
End: 2024-10-24
Attending: STUDENT IN AN ORGANIZED HEALTH CARE EDUCATION/TRAINING PROGRAM
Payer: MEDICARE

## 2024-10-24 VITALS
HEIGHT: 67.32 IN | DIASTOLIC BLOOD PRESSURE: 90 MMHG | WEIGHT: 180 LBS | SYSTOLIC BLOOD PRESSURE: 138 MMHG | HEART RATE: 72 BPM | BODY MASS INDEX: 27.92 KG/M2 | RESPIRATION RATE: 16 BRPM | TEMPERATURE: 97 F

## 2024-10-24 DIAGNOSIS — K02.9 CARIES: ICD-10-CM

## 2024-10-24 DIAGNOSIS — Z11.59 NEED FOR HEPATITIS C SCREENING TEST: ICD-10-CM

## 2024-10-24 DIAGNOSIS — I34.0 MITRAL VALVE INSUFFICIENCY, UNSPECIFIED ETIOLOGY: ICD-10-CM

## 2024-10-24 DIAGNOSIS — Z00.00 ENCOUNTER FOR MEDICARE ANNUAL WELLNESS EXAM: Primary | ICD-10-CM

## 2024-10-24 DIAGNOSIS — E55.9 VITAMIN D DEFICIENCY: ICD-10-CM

## 2024-10-24 DIAGNOSIS — E04.2 MULTIPLE THYROID NODULES: ICD-10-CM

## 2024-10-24 DIAGNOSIS — I77.1 TORTUOUS AORTA (HCC): ICD-10-CM

## 2024-10-24 DIAGNOSIS — E78.2 MIXED HYPERLIPIDEMIA: ICD-10-CM

## 2024-10-24 DIAGNOSIS — I65.23 BILATERAL CAROTID ARTERY STENOSIS: ICD-10-CM

## 2024-10-24 DIAGNOSIS — Z12.5 SCREENING PSA (PROSTATE SPECIFIC ANTIGEN): ICD-10-CM

## 2024-10-24 DIAGNOSIS — G47.33 OSA ON CPAP: ICD-10-CM

## 2024-10-24 DIAGNOSIS — R82.2 BILIRUBINURIA: ICD-10-CM

## 2024-10-24 DIAGNOSIS — I10 BENIGN ESSENTIAL HYPERTENSION: ICD-10-CM

## 2024-10-24 DIAGNOSIS — Z95.5 PRESENCE OF DRUG COATED STENT IN LAD CORONARY ARTERY: ICD-10-CM

## 2024-10-24 DIAGNOSIS — R49.0 HOARSENESS: ICD-10-CM

## 2024-10-24 LAB
ALBUMIN SERPL-MCNC: 4.9 G/DL (ref 3.2–4.8)
ALBUMIN/GLOB SERPL: 2.1 {RATIO} (ref 1–2)
ALP LIVER SERPL-CCNC: 30 U/L
ALT SERPL-CCNC: 30 U/L
AMYLASE SERPL-CCNC: 67 U/L (ref 30–118)
ANION GAP SERPL CALC-SCNC: 4 MMOL/L (ref 0–18)
AST SERPL-CCNC: 29 U/L (ref ?–34)
BASOPHILS # BLD AUTO: 0.02 X10(3) UL (ref 0–0.2)
BASOPHILS NFR BLD AUTO: 0.4 %
BILIRUB SERPL-MCNC: 1.2 MG/DL (ref 0.2–1.1)
BILIRUB UR QL STRIP.AUTO: NEGATIVE
BUN BLD-MCNC: 11 MG/DL (ref 9–23)
CALCIUM BLD-MCNC: 10.2 MG/DL (ref 8.7–10.4)
CHLORIDE SERPL-SCNC: 105 MMOL/L (ref 98–112)
CHOLEST SERPL-MCNC: 162 MG/DL (ref ?–200)
CLARITY UR REFRACT.AUTO: CLEAR
CO2 SERPL-SCNC: 30 MMOL/L (ref 21–32)
COLOR UR AUTO: YELLOW
CREAT BLD-MCNC: 0.92 MG/DL
EGFRCR SERPLBLD CKD-EPI 2021: 91 ML/MIN/1.73M2 (ref 60–?)
EOSINOPHIL # BLD AUTO: 0.14 X10(3) UL (ref 0–0.7)
EOSINOPHIL NFR BLD AUTO: 3 %
ERYTHROCYTE [DISTWIDTH] IN BLOOD BY AUTOMATED COUNT: 14.6 %
FASTING PATIENT LIPID ANSWER: YES
FASTING STATUS PATIENT QL REPORTED: YES
GLOBULIN PLAS-MCNC: 2.3 G/DL (ref 2–3.5)
GLUCOSE BLD-MCNC: 98 MG/DL (ref 70–99)
GLUCOSE UR STRIP.AUTO-MCNC: NORMAL MG/DL
HCT VFR BLD AUTO: 39.7 %
HCV AB SERPL QL IA: NONREACTIVE
HDLC SERPL-MCNC: 58 MG/DL (ref 40–59)
HGB BLD-MCNC: 12.5 G/DL
IMM GRANULOCYTES # BLD AUTO: 0.01 X10(3) UL (ref 0–1)
IMM GRANULOCYTES NFR BLD: 0.2 %
KETONES UR STRIP.AUTO-MCNC: NEGATIVE MG/DL
LDLC SERPL CALC-MCNC: 86 MG/DL (ref ?–100)
LEUKOCYTE ESTERASE UR QL STRIP.AUTO: NEGATIVE
LYMPHOCYTES # BLD AUTO: 1.01 X10(3) UL (ref 1–4)
LYMPHOCYTES NFR BLD AUTO: 21.5 %
MCH RBC QN AUTO: 27.2 PG (ref 26–34)
MCHC RBC AUTO-ENTMCNC: 31.5 G/DL (ref 31–37)
MCV RBC AUTO: 86.3 FL
MONOCYTES # BLD AUTO: 0.61 X10(3) UL (ref 0.1–1)
MONOCYTES NFR BLD AUTO: 13 %
NEUTROPHILS # BLD AUTO: 2.91 X10 (3) UL (ref 1.5–7.7)
NEUTROPHILS # BLD AUTO: 2.91 X10(3) UL (ref 1.5–7.7)
NEUTROPHILS NFR BLD AUTO: 61.9 %
NITRITE UR QL STRIP.AUTO: NEGATIVE
NONHDLC SERPL-MCNC: 104 MG/DL (ref ?–130)
OSMOLALITY SERPL CALC.SUM OF ELEC: 287 MOSM/KG (ref 275–295)
PH UR STRIP.AUTO: 7 [PH] (ref 5–8)
PLATELET # BLD AUTO: 192 10(3)UL (ref 150–450)
POTASSIUM SERPL-SCNC: 4.4 MMOL/L (ref 3.5–5.1)
PROT SERPL-MCNC: 7.2 G/DL (ref 5.7–8.2)
PROT UR STRIP.AUTO-MCNC: NEGATIVE MG/DL
RBC # BLD AUTO: 4.6 X10(6)UL
RBC UR QL AUTO: NEGATIVE
SODIUM SERPL-SCNC: 139 MMOL/L (ref 136–145)
SP GR UR STRIP.AUTO: 1.02 (ref 1–1.03)
TRIGL SERPL-MCNC: 97 MG/DL (ref 30–149)
TSI SER-ACNC: 1.34 MIU/ML (ref 0.55–4.78)
UROBILINOGEN UR STRIP.AUTO-MCNC: NORMAL MG/DL
VIT D+METAB SERPL-MCNC: 36.3 NG/ML (ref 30–100)
VLDLC SERPL CALC-MCNC: 15 MG/DL (ref 0–30)
WBC # BLD AUTO: 4.7 X10(3) UL (ref 4–11)

## 2024-10-24 PROCEDURE — 82150 ASSAY OF AMYLASE: CPT

## 2024-10-24 PROCEDURE — 84443 ASSAY THYROID STIM HORMONE: CPT

## 2024-10-24 PROCEDURE — 86803 HEPATITIS C AB TEST: CPT

## 2024-10-24 PROCEDURE — 96160 PT-FOCUSED HLTH RISK ASSMT: CPT | Performed by: STUDENT IN AN ORGANIZED HEALTH CARE EDUCATION/TRAINING PROGRAM

## 2024-10-24 PROCEDURE — 82306 VITAMIN D 25 HYDROXY: CPT

## 2024-10-24 PROCEDURE — 1159F MED LIST DOCD IN RCRD: CPT | Performed by: STUDENT IN AN ORGANIZED HEALTH CARE EDUCATION/TRAINING PROGRAM

## 2024-10-24 PROCEDURE — G0439 PPPS, SUBSEQ VISIT: HCPCS | Performed by: STUDENT IN AN ORGANIZED HEALTH CARE EDUCATION/TRAINING PROGRAM

## 2024-10-24 PROCEDURE — 99499 UNLISTED E&M SERVICE: CPT | Performed by: STUDENT IN AN ORGANIZED HEALTH CARE EDUCATION/TRAINING PROGRAM

## 2024-10-24 PROCEDURE — 1160F RVW MEDS BY RX/DR IN RCRD: CPT | Performed by: STUDENT IN AN ORGANIZED HEALTH CARE EDUCATION/TRAINING PROGRAM

## 2024-10-24 PROCEDURE — 81003 URINALYSIS AUTO W/O SCOPE: CPT

## 2024-10-24 PROCEDURE — 1170F FXNL STATUS ASSESSED: CPT | Performed by: STUDENT IN AN ORGANIZED HEALTH CARE EDUCATION/TRAINING PROGRAM

## 2024-10-24 PROCEDURE — 80061 LIPID PANEL: CPT

## 2024-10-24 PROCEDURE — 85025 COMPLETE CBC W/AUTO DIFF WBC: CPT

## 2024-10-24 PROCEDURE — 1126F AMNT PAIN NOTED NONE PRSNT: CPT | Performed by: STUDENT IN AN ORGANIZED HEALTH CARE EDUCATION/TRAINING PROGRAM

## 2024-10-24 PROCEDURE — 80053 COMPREHEN METABOLIC PANEL: CPT

## 2024-10-24 PROCEDURE — 36415 COLL VENOUS BLD VENIPUNCTURE: CPT

## 2024-10-24 RX ORDER — ASCORBIC ACID 125 MG
TABLET,CHEWABLE ORAL
COMMUNITY
Start: 2023-07-27

## 2024-10-24 NOTE — PROGRESS NOTES
Subjective:   Ugo Farah is a 69 year old male who presents for a MA AHA (Medicare Advantage Annual Health Assessment) and Subsequent Annual Wellness visit (Pt already had Initial Annual Wellness) and scheduled follow up of multiple significant but stable problems.     Med/Surg/Allergy/Fam hx updates: see below  Home: lives with wife; has three kids, has grandkids  Work: retired, human resources   Activities/Hobbies: golf  Diet: healthy overall  Exercise: rides bike, walking  Sleep: good - gets 7-8 hours at night, falls asleep well  Mood: good  Immunizations: discussed  Screenings: up to date with colonoscopy    Follows with dentist regularly. Having more cavities lately.   Started with CPAP two years ago.   Some hoarseness and having to clear throat a bit. Doesn't have a lot of acidic foods or carbonated drinks. Will still have coffee and chocolate.  Patient's mother is still alive 96 years old.   Wears mouth guard.  Patient's wife has laryngopharyngeal reflux.    Some possible soreness on left groin area when riding bike. No swelling or bruising. Similar pain to angiogram. Normal with bowel movements and urination.     Interested in probiotic and multivitamin.    Taking vitamin D with K2, taking it twice a week.     Follows with Dr. Griggs with cardiology.    Follows with Dr. Uribe annually with urology.    Following with orthopedics for right rotator cuff syndrome.      History/Other:   Fall Risk Assessment:   He has been screened for Falls and is low risk.      Cognitive Assessment:   He had a completely normal cognitive assessment - see flowsheet entries     Functional Ability/Status:   Ugo Farah has a completely normal functional assessment. See flowsheet for details.    Depression Screening (PHQ):  PHQ-2 SCORE: 0  , done 10/24/2024     Advanced Directives:   He does have a Living Will but we do NOT have it on file in Epic.    He does have a POA but we do NOT have it on file in  Epic.    Patient has Advance Care Planning documents but we do not have a copy in EMR. Discussed Advanced Care Planning with patient and instructed patient to get our office a copy to be scanned into EMR.      Patient Active Problem List   Diagnosis    Degeneration of cervical intervertebral disc    Donor of unspecified organ or tissue    Sciatica    Benign neoplasm of colon    Allergic rhinitis, cause unspecified    Cervicalgia    Radiculopathy of leg    Left hip pain    Lumbago    Benign essential hypertension    Intestinal bleeding    Prolapsed internal hemorrhoids, grade 4    External prolapsed hemorrhoids    Tortuous aorta (HCC)    Impingement syndrome of left shoulder    Multiple thyroid nodules    Atherosclerosis of both carotid arteries    Mitral valve insufficiency    Bilateral carotid artery stenosis    Coronary artery disease involving native coronary artery of native heart without angina pectoris    Presence of drug coated stent in LAD coronary artery    CHARISMA on CPAP    Vitamin D deficiency    Mixed hyperlipidemia    Hoarseness     Allergies:  He is allergic to ampicillin.    Current Medications:  Outpatient Medications Marked as Taking for the 10/24/24 encounter (Office Visit) with Amanda Abebe MD   Medication Sig    Magnesium Citrate 125 MG Oral Cap magnesium citrate 125 mg capsule, [RxNorm: 0337684]    loratadine 10 MG Oral Tab Take 1 tablet (10 mg total) by mouth daily.    lisinopril 10 MG Oral Tab Take 0.5 tablets (5 mg total) by mouth daily.    Coenzyme Q10 75 MG Oral Cap Ultra CoQ10  75 mg capsule, [RxNorm: 887055]    Rosuvastatin Calcium 10 MG Oral Tab Take 1 tablet (10 mg total) by mouth nightly.    aspirin 81 MG Oral Tab EC Take 1 tablet (81 mg total) by mouth daily. (Patient taking differently: Take 1 tablet (81 mg total) by mouth daily. 2 TABLETS DAILY)     Medical History:  He  has a past medical history of Cervicalgia, Chest pain, unspecified, Coronary atherosclerosis, High blood  pressure, motion sickness, Sprain of metacarpophalangeal (joint) of hand, Sprain of neck, Sprain of thoracic region, and Unspecified disorder of skin and subcutaneous tissue.  Surgical History:  He  has a past surgical history that includes colonoscopy (03/13/2009); other surgical history (03/13/2009); other; hemorrhoidectomy (10/2017); coronary stent placement (11/09/2020); and colonoscopy (N/A, 3/29/2022).   Family History:  His family history includes Arthritis in his father; Cancer in his brother and father; High Blood Pressure in his father; High Cholesterol in his mother; Other in his father.  Social History:  He  reports that he has never smoked. He has never used smokeless tobacco. He reports current alcohol use of about 1.0 - 2.0 standard drink of alcohol per week. He reports that he does not use drugs.    Tobacco:  He has never smoked tobacco.    CAGE Alcohol Screen:   CAGE screening score of 0 on 10/21/2024, showing low risk of alcohol abuse.      Patient Care Team:  Amanda Abebe MD as PCP - General  Luis Abrams MD as Consulting Physician (SURGERY, GENERAL)  Jenny Lynne, PT as Physical Therapist  Nakita Buckley PT as Physical Therapist  Eva Mathew PT as Physical Therapist  Warren Griggs MD (Cardiovascular Diseases)  Germaine Dowell PT as Physical Therapist (Physical Therapy)    Review of Systems  GENERAL: feels well otherwise  SKIN: denies any unusual skin lesions  EYES: denies blurred vision or double vision  HEENT: denies nasal congestion, sinus pain or ST  LUNGS: denies shortness of breath with exertion  CARDIOVASCULAR: denies chest pain on exertion  GI: denies abdominal pain, denies heartburn  : 1 per night nocturia  MUSCULOSKELETAL: denies back pain  NEURO: denies headaches  PSYCHE: denies depression or anxiety  HEMATOLOGIC: denies hx of anemia  ENDOCRINE: denies thyroid history  ALL/ASTHMA: denies hx of allergy or asthma    Objective:   Physical Exam  General Appearance:   Alert, cooperative, no distress, appears stated age   Head:  Normocephalic, without obvious abnormality, atraumatic   Eyes:  PERRL, conjunctiva/corneas clear, EOM's intact, both eyes   Ears:  Normal TM's and external ear canals, both ears   Nose: Nares normal, septum midline, mucosa normal, no drainage   Throat: Lips, mucosa, and tongue normal; teeth and gums normal   Neck: Supple, symmetrical, trachea midline, no adenopathy, thyroid: not enlarged, symmetric, no tenderness/mass/nodules, no JVD   Back:   Symmetric, no curvature, ROM normal   Lungs:   Clear to auscultation bilaterally, respirations unlabored   Chest Wall:  No tenderness or deformity   Heart:  Regular rate and rhythm, S1, S2 normal, no murmur, rub or gallop   Abdomen:   Soft, non-tender, bowel sounds active all four quadrants,  no masses, no organomegaly   Genitalia: Deferred to urology   Rectal: Deferred to urology   Extremities: Extremities normal, atraumatic, no cyanosis or edema   Pulses: 2+ and symmetric   Skin: Skin color, texture, turgor normal, no rashes or lesions   Lymph nodes: Cervical nodes normal   Neurologic: Normal     /90   Pulse 72   Temp 97 °F (36.1 °C) (Temporal)   Resp 16   Ht 5' 7.32\" (1.71 m)   Wt 180 lb (81.6 kg)   BMI 27.92 kg/m²  Estimated body mass index is 27.92 kg/m² as calculated from the following:    Height as of this encounter: 5' 7.32\" (1.71 m).    Weight as of this encounter: 180 lb (81.6 kg).    Medicare Hearing Assessment:   Hearing Screening    Screening Method: Finger Rub  Finger Rub Result: Pass         Visual Acuity:   Right Eye Visual Acuity: Uncorrected Right Eye Chart Acuity: 20/20   Left Eye Visual Acuity: Uncorrected Left Eye Chart Acuity: 20/20   Both Eyes Visual Acuity: Uncorrected Both Eyes Chart Acuity: 20/20   Able To Tolerate Visual Acuity: Yes        Assessment & Plan:   Ugo Farah is a 69 year old male who presents for a Medicare Assessment.     1. Encounter for Medicare annual  wellness exam (Primary)  2. Benign essential hypertension - BP acceptable, following with cardiology, appreciate evaluation and recommendations  -     CBC With Differential With Platelet; Future; Expected date: 10/24/2024  -     Comp Metabolic Panel (14); Future; Expected date: 10/24/2024  -     TSH W Reflex To Free T4; Future; Expected date: 10/24/2024  -     Urinalysis with Culture Reflex; Future; Expected date: 10/24/2024  3. Screening PSA (prostate specific antigen) - per urology, not due yet  4. Need for hepatitis C screening test  - discussed recommendation for Hepatitis C screening in patients born between 1945 to 1965, patient agreeable  -     HCV Antibody; Future; Expected date: 10/24/2024  5. Tortuous aorta (HCC) - per cardiology  -     Lipid Panel; Future; Expected date: 10/24/2024  6. Presence of drug coated stent in LAD coronary artery - following with cardiology, continue medications per cardiology  7. Mitral valve insufficiency, unspecified etiology - monitoring per cardiology  8. Bilateral carotid artery stenosis - optimize cholesterol levels  -     Lipid Panel; Future; Expected date: 10/24/2024  9. Multiple thyroid nodules - consider repeat US in the future  10. Vitamin D deficiency - will check level to determine supplementation recommendations as indicated.   -     Comp Metabolic Panel (14); Future; Expected date: 10/24/2024  -     Vitamin D; Future; Expected date: 10/24/2024  11. Mixed hyperlipidemia will follow labs, meds per cardiology, appreciate evaluation and recommendations  -     CBC With Differential With Platelet; Future; Expected date: 10/24/2024  -     Comp Metabolic Panel (14); Future; Expected date: 10/24/2024  -     Lipid Panel; Future; Expected date: 10/24/2024  12. Bilirubinuria   Will repeat urine to verify  -     Urinalysis with Culture Reflex; Future; Expected date: 10/24/2024  13. CHARISMA on CPAP  Per pulmonology, appreciate evaluation and recommendations  14. Caries patient has  increased cavities. Will check amylase. Will refer to ENT, appreciate evaluation and recommendations  -     Amylase; Future; Expected date: 10/24/2024  -     Cancel: ENT - INTERNAL  -     ENT - INTERNAL  15. Hoarseness patient has hoarseness, will refer to ENT to evaluate further. Differential diagnosis includes laryngopharyngeal reflux, vocal cord anomaly  -     Cancel: ENT - INTERNAL  -     ENT - INTERNAL    The patient indicates understanding of these issues and agrees to the plan.  Reinforced healthy diet, lifestyle, and exercise.      Return in 1 year (on 10/24/2025) for Medicare Annual Wellness Visit, or sooner if needed.     Amanda Abebe MD, 10/24/2024     Supplementary Documentation:   General Health:  In the past six months, have you lost more than 10 pounds without trying?: (Patient-Rptd) 2 - No  Has your appetite been poor?: (Patient-Rptd) No  Type of Diet: (Patient-Rptd) Balanced  How does the patient maintain a good energy level?: (Patient-Rptd) Appropriate Exercise;Daily Walks;Stretching  How would you describe your daily physical activity?: (Patient-Rptd) Moderate  How would you describe your current health state?: (Patient-Rptd) Good  How do you maintain positive mental well-being?: (Patient-Rptd) Social Interaction;Puzzles;Games;Visiting Friends;Visiting Family  On a scale of 0 to 10, with 0 being no pain and 10 being severe pain, what is your pain level?: (Patient-Rptd) 0 - (None)  In the past six months, have you experienced urine leakage?: (Patient-Rptd) 0-No  At any time do you feel concerned for the safety/well-being of yourself and/or your children, in your home or elsewhere?: (Patient-Rptd) No  Have you had any immunizations at another office such as Influenza, Hepatitis B, Tetanus, or Pneumococcal?: (Patient-Rptd) No    Health Maintenance   Topic Date Due    Pneumococcal Vaccine: 65+ Years (1 of 2 - PCV) Never done    Zoster Vaccines (1 of 2) Never done    MA Annual Health Assessment   01/01/2024    Annual Depression Screening  01/01/2024    PSA  07/07/2024    COVID-19 Vaccine (3 - 2023-24 season) 09/01/2024    Influenza Vaccine (1) 10/01/2024    Colorectal Cancer Screening  03/29/2032    Fall Risk Screening (Annual)  Completed

## 2024-10-24 NOTE — PATIENT INSTRUCTIONS
Try a vitamin that is USP certified.  Refill policies:      Allow 3 business days for refills; controlled substances may take longer.  Contact your pharmacy at least 5-7 business days prior to running out of medication and have them send an electronic request or submit through the \"request refill\" option thru your [employer] account. No need to do both, as multiple requests will create an automated ArcMail message to notify of a denial for one of the duplicated requests, causing you undue confusion.   Refills are NOT addressed on weekends; covering physicians do not authorize routine medications on weekends.  No narcotics or controlled substances are refilled after noon on Fridays or by on call physicians.  By law, narcotics cannot be faxed or phoned into your pharmacy.  If your prescription is due for a refill, you may be due for a follow up appointment. Please call our office at [phone] to make an appointment or schedule an appointment via Arc[employer].  To best provide you care, patients receiving routine medications need to be seen at least twice a year. Patients receiving narcotic/controlled substance medications need to be seen at least once every 3 months.  In the event that your preferred pharmacy does not have the requested medication in stock (ie Backordered), it is your responsibility to find another pharmacy that has the requested medication available. We will gladly send a new prescription to that pharmacy at your request.  controlled substances may not be able to be filled out of state due to license restrictions.  If you have a planned trip, it's best to call your pharmacy at least 5-7 business days to prevent any delays in your medication refill.    Scheduling Tests:    If your physician has ordered radiology tests such as MRI or CT scans, please contact Central Scheduling at [phone] right away to schedule the test.  Once scheduled, the [facility] Centralized Referral Team will work with your insurance  carrier to obtain pre-certification or prior authorization.  Depending on your insurance carrier, approval may take 3-10 days.  It is highly recommended patients assure they have received an authorization before having a test performed.  If test is done without insurance authorization, patient may be responsible for the entire amount billed.      Precertification and Prior Authorizations:  If your physician has recommended that you have a procedure or additional testing performed the [facility] Centralized Referral Team will contact your insurance carrier to obtain pre-certification or prior authorization.    You are strongly encouraged to contact your insurance carrier to verify that your procedure/test has been approved and is a COVERED benefit.  Although the [facility] Centralized Referral Team does its due diligence, the insurance carrier gives the disclaimer that \"Although the procedure is authorized, this does not guarantee payment.\"    Ultimately the patient is responsible for payment.   Thank you for your understanding in this matter.  Paperwork Completion:  If you require FMLA or disability paperwork for your recovery, please make sure to either drop it off or have it faxed to our office at [phone]. Be sure the form has your name and date of birth on it.  The form will be faxed to our Forms Department and they will complete it for you.  There is a 25$ fee for all forms that need to be filled out.  Please be aware there is a 10-14 day turnaround time.  You will need to sign a release of information (GURJIT) form if your paperwork does not come with one.  You may call the Forms Department with any questions at [phone].  Their fax number is [phone].

## 2024-10-25 NOTE — PROGRESS NOTES
[facility]  Lipid Management Program - Leqvio (Inclisiran)     Claude E Campbell  is a 61 y.o. male referred by their provider, Opal Ho, to the Hyperlipidemia Patient Management program offered by [facility] for Lipid Management.  Claude E Campbell is  treated for ASCVD and currently takes Leqvio and atorvastatin 80mg.  He denies any problems or side effects with last injection.     A Follow-up outreach was conducted, including assessment of therapy appropriateness and specialty medication education for LEQVIO (inclisiran). The patient was introduced to services offered by [facility], including: regular assessments, refill coordination, curbside pick-up or mail order delivery options, prior authorization maintenance, and financial assistance programs as applicable. The patient was also provided with contact information for the pharmacy team.     Insurance Coverage & Financial Support  [place] Medicaid     Relevant Past Medical History and Comorbidities  Relevant medical history and concomitant health conditions were discussed with the patient. The patient's chart has been reviewed for relevant past medical history and comorbid conditions and updated as necessary.  Past Medical History:   Diagnosis Date    Coronary artery disease     Elevated cholesterol     Hypertension     Myocardial infarction      Social History     Socioeconomic History    Marital status:    Tobacco Use    Smoking status: Every Day     Current packs/day: 1.00     Average packs/day: 1 pack/day for 20.0 years (20.0 ttl pk-yrs)     Types: Cigarettes    Smokeless tobacco: Current     Types: Snuff   Vaping Use    Vaping status: Never Used   Substance and Sexual Activity    Alcohol use: Yes     Alcohol/week: 6.0 standard drinks of alcohol     Types: 6 Cans of beer per week     Comment: COUPLE PER DAY     Drug use: No    Sexual activity: Defer  CHIEF COMPLAINT   Jonh Davalos is a 73year old male who presents for a complete physical exam.   HPI:   Here for physical.  Seeing Dr. Ni cabrera last fall. Hasn't done his 2nd covid vaccine.   Doesn't think he is going to do it since has con       Problem list reviewed by Lucille Huff PharmD on 10/25/2024 at 10:41 AM    Allergies  Known allergies and reactions were discussed with the patient. The patient's chart has been reviewed for  allergy information and updated as necessary.   No Known Allergies    Allergies reviewed by Lucille Huff PharmD on 10/25/2024 at 10:33 AM    Relevant Laboratory Values  Relevant laboratory values were discussed with the patient.   Lab Results   Component Value Date    CHOL 105 07/05/2023    CHLPL 163 03/05/2016    TRIG 176 (H) 07/05/2023    HDL 43 07/05/2023    LDL 33 07/05/2023       Current Medication List  This medication list has been reviewed with the patient and evaluated for any interactions or necessary modifications/recommendations, and updated to include all prescription medications, OTC medications, and supplements the patient is currently taking.  This list reflects what is contained in the patient's profile, which has also been marked as reviewed to communicate to other providers it is the most up to date version of the patient's current medication therapy.     Current Outpatient Medications:     amLODIPine (NORVASC) 5 MG tablet, Take 1 tablet by mouth Daily. for blood pressure, Disp: 90 tablet, Rfl: 3    atorvastatin (LIPITOR) 80 MG tablet, Take 1 tablet by mouth Daily., Disp: 90 tablet, Rfl: 3    buprenorphine-naloxone (SUBOXONE) 8-2 MG per SL tablet, PLACE 2 TABLETS UNDER THE TONGUE AND LET DISSOLVE EVERY DAY AS DIRECTED, Disp: , Rfl:     carvedilol (COREG) 3.125 MG tablet, Take 1 tablet by mouth 2 (Two) Times a Day With Meals., Disp: 180 tablet, Rfl: 3    clopidogrel (PLAVIX) 75 MG tablet, Take 1 tablet by mouth Daily., Disp: 90 tablet, Rfl: 3    docusate sodium (COLACE) 100 MG capsule, Take 1 capsule by mouth Daily As Needed for Constipation., Disp: 90 capsule, Rfl: 2    gabapentin (NEURONTIN) 400 MG capsule, TAKE ONE CAPSULE BY MOUTH THREE TIMES A DAY AS NEEDED FOR NERVE PAIN, Disp: ,  Sprain of metacarpophalangeal (joint) of hand    • Sprain of neck    • Sprain of thoracic region    • Unspecified disorder of skin and subcutaneous tissue       Past Surgical History:   Procedure Laterality Date   • COLONOSCOPY  03/13/2009   • HEMORRHOIDEC Rfl:     Inclisiran Sodium (Leqvio) 284 MG/1.5ML solution prefilled syringe, Inject 1.5 mL under the skin into the appropriate area as directed Every 6 (Six) Months., Disp: 1.5 mL, Rfl: 1    lisinopril (PRINIVIL,ZESTRIL) 20 MG tablet, Take 1 tablet by mouth Daily., Disp: 90 tablet, Rfl: 3    pantoprazole (PROTONIX) 40 MG EC tablet, Take 1 tablet by mouth Daily., Disp: , Rfl:     tadalafil (Cialis) 10 MG tablet, Take 1 tablet by mouth Daily As Needed for Erectile Dysfunction. Do not take more than 2 tabs in 24 hour period, Disp: 20 tablet, Rfl: 11    Medicines reviewed by Lucille Huff PharmD on 10/25/2024 at 10:35 AM  Medicines reviewed by Lucille Huff PharmD on 10/25/2024 at 10:40 AM  Medicines reviewed by Lucille Huff PharmD on 10/25/2024 at 10:41 AM    Drug Interactions  None with Leqvio    Goals of Therapy  Goals related to the patient's specialty therapy were discussed with the patient. The Patient Goals segment of this outreach has been reviewed and updated.   Goals Addressed Today        Specialty Pharmacy General Goal      LDL reduction              Medication Assessment & Plan  Patient will begin Leqvio 284mg SC every 6 months.  Juanita CruzD Administered Leqvio 284mg SUBQ in back of right arm.     1st dose: 2/17/23  2nd dose: 5/18/23  3rd dose: 11/2/23  4th dose: 4/30/24  5th Dose: 10/25/24  Medication education provided at initial visit.   LDL previously at goal. Lipid panel will need to be rechecked. Order placed today.   Care Coordinator to set up future refill outreaches, coordinate prescription delivery, and escalate clinical questions to pharmacist.  Welcome information and patient satisfaction survey to be sent by specialty pharmacy team with patient's initial fill.  Patient will continue regular follow-up with cardiology  Will follow up in 6 months for next injection. Pharmacist to perform regular assessments no more than (6) months from the previous assessment.  organomegaly. : Two descended testes,no masses, no hernia, no penile lesions. Prostate smooth. Nontender. Symmetric. Mild prostate enlargement.    MUSCULOSKELETAL: No deformity  EXTREMITIES: no cyanosis, clubbing or edema  NEURO: Oriented times three,     Attestation  Therapeutic appropriateness: Appropriate   I attest the patient was actively involved in and has agreed to the above plan of care. If the prescribed therapy is at any point deemed not appropriate based on the current or future assessments, a consultation will be initiated with the patient's specialty care provider to determine the best course of action. The revised plan of therapy will be documented along with any required assessments and/or additional patient education provided.       Lucille Huff, Cornelia  10/25/2024  10:41 EDT

## 2024-11-08 ENCOUNTER — TELEMEDICINE (OUTPATIENT)
Facility: CLINIC | Age: 69
End: 2024-11-08
Payer: MEDICARE

## 2024-11-08 DIAGNOSIS — I10 BENIGN ESSENTIAL HYPERTENSION: ICD-10-CM

## 2024-11-08 DIAGNOSIS — G47.33 OSA ON CPAP: Primary | ICD-10-CM

## 2024-11-08 DIAGNOSIS — I25.10 CORONARY ARTERY DISEASE INVOLVING NATIVE CORONARY ARTERY OF NATIVE HEART WITHOUT ANGINA PECTORIS: ICD-10-CM

## 2024-11-08 PROCEDURE — 1160F RVW MEDS BY RX/DR IN RCRD: CPT | Performed by: PHYSICIAN ASSISTANT

## 2024-11-08 PROCEDURE — 1159F MED LIST DOCD IN RCRD: CPT | Performed by: PHYSICIAN ASSISTANT

## 2024-11-08 PROCEDURE — 99214 OFFICE O/P EST MOD 30 MIN: CPT | Performed by: PHYSICIAN ASSISTANT

## 2024-11-08 NOTE — PROGRESS NOTES
[facility] PULMONARY  SLEEP PROGRESS NOTE        HPI:   This is a 69 year old male coming in for No chief complaint on file.  Annual     HPI:     Initially diagnosed with CHARISMA due to sx - snoring, dry mouth, and heart disease - and family history    Since being on CPAP, snoring has resolved. Dry mouth seems to have worsened. He is using a FFM  More cavities recently as well  Using humidifier - water does not run out    Notices that his AHI per night can spike  Avg over the last 90 days is 4.4  Primarily back sleeper, occasionally on side  Weight now 177#, weight during titration was 173#      In bed 11p  Out of bed 730a  SL quick  Nighttime awakenings 1x to use RR, no trouble falling back asleep  Naps rare  Caffeine 1-2 cups coffee daily    Denies teeth grinding, leg cramps, restless legs, headaches, tinnitus, chest pain, back pain, bloating    DME company: [employer]  Mask type: FFM      Dianamauricio Ugo  08/10/2024 - 2024  Patient ID: [id number]  : 1955  Age: 69 years  Gender: Male  [address], [address]  Phone: [phone]  Fax: [phone]  Email: [email or web address]  Compliance Report  Compliance  Pay[employer] Standard  Usage 08/10/2024 - 2024  Usage days 90/90 days (100%)  >= 4 hours 90 days (100%)  < 4 hours 0 days (0%)  Usage hours 707 hours 15 minutes  Average usage (total days) 7 hours 52 minutes  Average usage (days used) 7 hours 52 minutes  Median usage (days used) 7 hours 44 minutes  Total used hours (value since last reset - 2024) 5,407 hours  AirSense 11 AutoSet  Serial number [id number]  Mode CPAP  Set pressure 10 cmH2O  EPR Fulltime  EPR level 3  Therapy  Leaks - L/min Median: 0.0 95th percentile: 8.6 Maximum: 17.7  Events per hour AI: 3.3 HI: 1.1 AHI: 4.4  Apnea Index Central: 0.4 Obstructive: 2.8 Unknown: 0.1  RERA Index 0.5  Cheyne-Rodriguez respiration (average duration per night) 0 minutes (0%)    Patient: Sleep review of systems today: see  form.      9/28/2022 PSG  Respiratory Analysis: The Apnea-Hypopnea Index (AHI) was 20.7 events per hour. REM related AHI was 37.1 events per hour. Supine related AHI was 20.7. Lateral related AHI was 0. The Central Apnea Index was 0.2. The oxygen saturation chris was 85.7% and patient spent 0.5% with saturations less than 88%.         Pt  PCP:  Amanda Abebe MD  No referring provider defined for this encounter.  Patient Care Team:  Amanda Abebe MD as PCP - General  Luis Abrams MD as Consulting Physician (SURGERY, GENERAL)  Jenny Lynne, PT as Physical Therapist  Nakita Buckley, PT as Physical Therapist  Eva Mathew, PT as Physical Therapist  Warren Griggs MD (Cardiovascular Diseases)  Germaine Dowell, PT as Physical Therapist (Physical Therapy)           No data to display                Lab Results   Component Value Date    IRON 78 10/06/2016     Lab Results   Component Value Date    RICHIE 48.0 06/29/2017     Lab Results   Component Value Date    VITD 36.3 10/24/2024     Lab Results   Component Value Date    B12 452 03/19/2015         Past Medical History:    Cervicalgia    Chest pain, unspecified    Coronary atherosclerosis    High blood pressure    Hx of motion sickness    Sprain of metacarpophalangeal (joint) of hand    Sprain of neck    Sprain of thoracic region    Unspecified disorder of skin and subcutaneous tissue     Past Surgical History:   Procedure Laterality Date    Colonoscopy  03/13/2009    Colonoscopy N/A 3/29/2022    Procedure: COLONOSCOPY with cold snare polypectomy;  Surgeon: Eron Bell MD;  Location:  ENDOSCOPY    Coronary stent placement  11/09/2020    CARDIAC CATHETERIZATION/PERCUTANEOUS CORONARY INTERVENTION     Hemorrhoidectomy  10/2017    Other      [place] teeth    Other surgical history  03/13/2009    Fiberoptic Examinations Gastroscopy     Social History:  Social History     Social History Narrative    The patient does not use an assistive device..       The patient does live in a home with stairs.     Social History     Socioeconomic History    Marital status:    Tobacco Use    Smoking status: Never    Smokeless tobacco: Never   Vaping Use    Vaping status: Never Used   Substance and Sexual Activity    Alcohol use: Yes     Alcohol/week: 1.0 - 2.0 standard drink of alcohol     Types: 1 - 2 Standard drinks or equivalent per week     Comment: social drinking    Drug use: No    Sexual activity: Yes     Partners: Female   Other Topics Concern    Caffeine Concern Yes     Comment: 2 cups coffee daily    Exercise Yes     Comment: 3xwk    Seat Belt Yes   Social History Narrative    The patient does not use an assistive device..      The patient does live in a home with stairs.     Social Drivers of Health     Physical Activity: Inactive (2020)    [facility]    Exercise Vital Sign     Days of Exercise per Week: 0 days     Minutes of Exercise per Session: 0 min    Received from [facility], [facility]    Social Connections    Received from [facility], [facility]    Housing Stability     Family History:  Family History   Problem Relation Age of Onset    Arthritis Father     High Blood Pressure Father     Cancer Father         prostate cancer  age 96    Other (Other) Father         spinal stenosis    High Cholesterol Mother     Cancer Brother         tongue     Allergies:  Allergies[1]  Current Meds:  Current Outpatient Medications   Medication Sig Dispense Refill    Magnesium Citrate 125 MG Oral Cap magnesium citrate 125 mg capsule, [RxNorm: 0391770]      loratadine 10 MG Oral Tab Take 1 tablet (10 mg total) by mouth daily.      lisinopril 10 MG Oral Tab Take 0.5 tablets (5 mg total) by mouth daily.      Coenzyme Q10 75 MG Oral Cap Ultra CoQ10  75 mg capsule, [RxNorm: 912532]      Rosuvastatin Calcium 10 MG Oral Tab Take 1 tablet (10  mg total) by mouth nightly. 30 tablet 3    aspirin 81 MG Oral Tab EC Take 1 tablet (81 mg total) by mouth daily. (Patient taking differently: Take 1 tablet (81 mg total) by mouth daily. 2 TABLETS DAILY) 60 tablet 0      Counseling given: Not Answered         Problem List:  Patient Active Problem List   Diagnosis    Degeneration of cervical intervertebral disc    Donor of unspecified organ or tissue    Sciatica    Benign neoplasm of colon    Allergic rhinitis, cause unspecified    Cervicalgia    Radiculopathy of leg    Left hip pain    Lumbago    Benign essential hypertension    Intestinal bleeding    Prolapsed internal hemorrhoids, grade 4    External prolapsed hemorrhoids    Tortuous aorta (HCC)    Impingement syndrome of left shoulder    Multiple thyroid nodules    Atherosclerosis of both carotid arteries    Mitral valve insufficiency    Bilateral carotid artery stenosis    Coronary artery disease involving native coronary artery of native heart without angina pectoris    Presence of drug coated stent in LAD coronary artery    CHARISMA on CPAP- moderate, AHI 20.7    Vitamin D deficiency    Mixed hyperlipidemia    Hoarseness       REVIEW OF SYSTEMS:   Review of Systems  See HPI    EXAM:   There were no vitals taken for this visit. Estimated body mass index is 27.92 kg/m² as calculated from the following:    Height as of 10/24/24: 5' 7.32\" (1.71 m).    Weight as of 10/24/24: 180 lb (81.6 kg).   Neck in inches:      Wt Readings from Last 6 Encounters:   10/24/24 180 lb (81.6 kg)   01/04/24 175 lb (79.4 kg)   12/29/23 175 lb (79.4 kg)   09/05/23 181 lb (82.1 kg)   03/27/23 175 lb (79.4 kg)   10/10/22 171 lb 12.8 oz (77.9 kg)     BP Readings from Last 3 Encounters:   10/24/24 138/90   01/04/24 145/86   12/29/23 (!) 145/97     Patient weight not recorded    Vital signs reviewed.  Physical Exam  Vitals and nursing note reviewed.   Constitutional:       Appearance: Normal appearance.   HENT:      Head: Normocephalic and  atraumatic.      Right Ear: External ear normal.      Left Ear: External ear normal.   Pulmonary:      Effort: Pulmonary effort is normal. No respiratory distress.   Musculoskeletal:      Cervical back: Normal range of motion and neck supple.   Neurological:      General: No focal deficit present.      Mental Status: He is alert and oriented to person, place, and time.   Psychiatric:         Attention and Perception: Attention and perception normal.         Mood and Affect: Mood and affect normal.         Speech: Speech normal.         Behavior: Behavior normal. Behavior is cooperative.         Thought Content: Thought content normal.         Cognition and Memory: Cognition and memory normal.         Judgment: Judgment normal.         ASSESSMENT AND PLAN:   1. CHARISMA on CPAP- moderate, AHI 20.7  - Adjust pressure from 10 to 10-12 CWP  - Long discussion regarding dry mouth. Will try xylimelts, adjusting humidity and temp on machine first. Has ENT consult soon for possible LPR  - Could consider trialing nasal mask if other methods are not effective    2. Benign essential hypertension    3. Coronary artery disease involving native coronary artery of native heart without angina pectoris    There are no Patient Instructions on file for this visit.      Advised if still with sleep apnea and not using CPAP has a  7 fold increase in risk of heart attack, stroke, abnormal heart rhythm  and death,  increased risk of driving accidents.   Advised to refrain from driving when sleepy.    COMPLIANCE is required by insurance for 4 hours a night most nights of the week.  Recommend weight loss, and maintain and optimal BMI with Exercise 30 minutes most days of the week to target heart rate .     Advised patient to change filters,masks,hoses  and tubes and equiptment on a  regular schedule.  Filters and seals shall be changed every 1 month,  Hoses every 3 months,   CPAP mask and humidifier  chamber changed every 6 month  with the Durable  medical equipment provider.       Due to covid crisis this visit was carried out electronically by  [place]     “Please note that the following visit was completed using two-way, real-time interactive audio and/or video  Or audio alone communication after patient consent.  This has been done in good joan to provide continuity of care in the best interest of the provider-patient relationship, due to the ongoing national public health crisis/national emergency and because of restrictions of visitation.  There are limitations of this visit as an adequate physical exam could not be performed.  Every conscious effort was taken to allow for sufficient and adequate time.  This billing was spent on reviewing labs,  personal device downloads, medications, radiology tests and decision making.  Appropriate medical decision-making and tests are ordered as detailed in the plan of care”      Meds & Refills for this Visit:  Requested Prescriptions      No prescriptions requested or ordered in this encounter       Outcome: Parent verbalizes understanding. Parent is notified to call with any questions, complications, allergies, or worsening or changing symptoms.  Parent is to call with any side effects or complications from the treatments as a result of today.     This visit is conducted using Telemedicine with live, interactive video and audio.    Patient has been referred to the [facility] website at [email or web address] to review the yearly Consent to Treat document.    Patient understands and accepts financial responsibility for any deductible, co-insurance and/or co-pays associated with this service.      \" This note was created utilizing Dragon speech recognition software.  Please excuse any grammatical errors. Call my office if you have any questions regarding this note. \"     Jaqui LOMBARDO LPN  11/8/2024  8:20 AM         [1]   Allergies  Allergen Reactions    Ampicillin HIVES     Caps

## 2024-12-19 ENCOUNTER — OFFICE VISIT (OUTPATIENT)
Facility: LOCATION | Age: 69
End: 2024-12-19
Payer: MEDICARE

## 2024-12-19 DIAGNOSIS — R68.2 DRY MOUTH: ICD-10-CM

## 2024-12-19 DIAGNOSIS — R49.0 HOARSENESS: Primary | ICD-10-CM

## 2024-12-19 PROCEDURE — 99203 OFFICE O/P NEW LOW 30 MIN: CPT | Performed by: OTOLARYNGOLOGY

## 2024-12-19 PROCEDURE — 31575 DIAGNOSTIC LARYNGOSCOPY: CPT | Performed by: OTOLARYNGOLOGY

## 2024-12-19 PROCEDURE — 1159F MED LIST DOCD IN RCRD: CPT | Performed by: OTOLARYNGOLOGY

## 2024-12-19 PROCEDURE — 1160F RVW MEDS BY RX/DR IN RCRD: CPT | Performed by: OTOLARYNGOLOGY

## 2024-12-19 RX ORDER — FAMOTIDINE 20 MG/1
20 TABLET, FILM COATED ORAL NIGHTLY
Qty: 90 TABLET | Refills: 3 | Status: SHIPPED | OUTPATIENT
Start: 2024-12-19

## 2024-12-19 NOTE — PROGRESS NOTES
Ugo Farah is a 69 year old male.   Chief Complaint   Patient presents with    Throat Problem     HPI:   He is having dry mouth.  He is getting more cavities.  He has noticed some reflux.  He gets some hoarseness.  He is on CPAP.  At times he is a mouth breather.  He rarely ever uses Claritin.  He does complain of some arthritis in his hands.  Current Outpatient Medications   Medication Sig Dispense Refill    famotidine (PEPCID) 20 MG Oral Tab Take 1 tablet (20 mg total) by mouth at bedtime. 90 tablet 3    Magnesium Citrate 125 MG Oral Cap magnesium citrate 125 mg capsule, [RxNorm: 8731037]      loratadine 10 MG Oral Tab Take 1 tablet (10 mg total) by mouth daily.      lisinopril 10 MG Oral Tab Take 0.5 tablets (5 mg total) by mouth daily.      Coenzyme Q10 75 MG Oral Cap Ultra CoQ10  75 mg capsule, [RxNorm: 245584]      Rosuvastatin Calcium 10 MG Oral Tab Take 1 tablet (10 mg total) by mouth nightly. 30 tablet 3    aspirin 81 MG Oral Tab EC Take 1 tablet (81 mg total) by mouth daily. (Patient taking differently: Take 1 tablet (81 mg total) by mouth daily. 2 TABLETS DAILY) 60 tablet 0      Past Medical History:    Cervicalgia    Chest pain, unspecified    Coronary atherosclerosis    High blood pressure    Hx of motion sickness    Sprain of metacarpophalangeal (joint) of hand    Sprain of neck    Sprain of thoracic region    Unspecified disorder of skin and subcutaneous tissue      Social History:  Social History     Socioeconomic History    Marital status:    Tobacco Use    Smoking status: Never    Smokeless tobacco: Never   Vaping Use    Vaping status: Never Used   Substance and Sexual Activity    Alcohol use: Yes     Alcohol/week: 1.0 - 2.0 standard drink of alcohol     Types: 1 - 2 Standard drinks or equivalent per week     Comment: social drinking    Drug use: No    Sexual activity: Yes     Partners: Female   Other Topics Concern    Caffeine Concern Yes     Comment: 2 cups coffee daily     Exercise Yes     Comment: 3xwk    Seat Belt Yes   Social History Narrative    The patient does not use an assistive device..      The patient does live in a home with stairs.     Social Drivers of Health     Physical Activity: Inactive (11/24/2020)    [facility]    Exercise Vital Sign     Days of Exercise per Week: 0 days     Minutes of Exercise per Session: 0 min    Received from [facility], [facility]    Social Connections    Received from [facility], [facility]    Housing Stability      Past Surgical History:   Procedure Laterality Date    Colonoscopy  03/13/2009    Colonoscopy N/A 3/29/2022    Procedure: COLONOSCOPY with cold snare polypectomy;  Surgeon: Eron Bell MD;  Location:  ENDOSCOPY    Coronary stent placement  11/09/2020    CARDIAC CATHETERIZATION/PERCUTANEOUS CORONARY INTERVENTION     Hemorrhoidectomy  10/2017    Other      [place] teeth    Other surgical history  03/13/2009    Fiberoptic Examinations Gastroscopy         REVIEW OF SYSTEMS:   GENERAL HEALTH: feels well otherwise  GENERAL : denies fever, chills, sweats, weight loss, weight gain  SKIN: denies any unusual skin lesions or rashes  RESPIRATORY: denies shortness of breath with exertion  NEURO: denies headaches    EXAM:   There were no vitals taken for this visit.    System Findings Details   Constitutional  Overall appearance - Normal.   Psychiatric  Orientation - Oriented to time, place, person & situation. Appropriate mood and affect.   Head/Face  Facial features -- Normal. Skull - Normal.   Eyes  Pupils equal ,round ,react to light and accomidate   Ears, Nose, Throat, Neck  Ears clear nose mild septal deviation oral cavity clear pharynx clear neck no masses   Neurological  Memory - Normal. Cranial nerves - Cranial nerves II through XII grossly intact.   Lymph Detail  Submental. Submandibular.  Anterior cervical. Posterior cervical. Supraclavicular.     Flexible Laryngoscopy Procedure Note (24548)    Due to inability for adequate examination of the larynx and need for magnification to perform the examination, endoscopy was performed.  Risks and benefits were discussed with patient/family and they have given verbal consent to proceed.    Pre-operative Diagnosis:   1. Hoarseness    2. Dry mouth        Post-operative Diagnosis: Same    Procedure: Diagnostic flexible fiberoptic laryngoscopy    Anesthesia: topical lidocaine    Surgeon: Nikolai Rangel MD    EBL: 0cc    Procedure Detail & Findings: The tongue epiglottis and vocal cords are normal with normal mobility    Condition: Stable    Complications: Patient tolerated the procedure well with no immediate complications.      Nikolai Rangel MD    ASSESSMENT AND PLAN:   1. Hoarseness  Examination today is essentially negative.  The symptoms may be due to reflux disease.  He will try Pepcid nightly.  He would do this for 4 to 6 weeks.  He will then report back to me.    2. Dry mouth  Pepcid fails this could be due to chronic nasal congestion.  The other outside possibility is we are dealing with some type of autoimmune etiology given the arthritis in his hands.      The patient indicates understanding of these issues and agrees to the plan.    No follow-ups on file.    Nikolai Rangel MD  12/19/2024  10:52 AM

## 2025-01-31 ENCOUNTER — MED REC SCAN ONLY (OUTPATIENT)
Facility: CLINIC | Age: 70
End: 2025-01-31

## 2025-06-16 ENCOUNTER — PATIENT MESSAGE (OUTPATIENT)
Facility: CLINIC | Age: 70
End: 2025-06-16

## 2025-06-16 NOTE — TELEPHONE ENCOUNTER
Usage 05/17/2025 - 06/15/2025  Usage days 30/30 days (100%)  >= 4 hours 30 days (100%)  < 4 hours 0 days (0%)  Usage hours 239 hours 53 minutes  Average usage (total days) 8 hours 0 minutes  Average usage (days used) 8 hours 0 minutes  Median usage (days used) 8 hours 15 minutes  Total used hours (value since last reset - 06/15/2025) 7,190 hours  AirSense 11 AutoSet  Serial number [id number]  Mode AutoSet  Min Pressure 10 cmH2O  Max Pressure 12 cmH2O  EPR Fulltime  EPR level 3  Response Soft  Therapy  Pressure - cmH2O Median: 10.5 95th percentile: 11.6 Maximum: 11.8  Leaks - L/min Median: 0.0 95th percentile: 0.7 Maximum: 7.2  Events per hour AI: 2.9 HI: 0.6 AHI: 3.5  Apnea Index Central: 0.2 Obstructive: 2.6 Unknown: 0.1  RERA Index 0.3  Cheyne-Rodriguez respiration (average duration per night) 0 minutes (0%)

## (undated) DIAGNOSIS — R19.4 CHANGE IN BOWEL HABITS: Primary | ICD-10-CM

## (undated) DEVICE — TRAP SPEC REMOVAL ETRAP 15CM

## (undated) DEVICE — SPONGE RAYTEC 4X4 RF DETECT

## (undated) DEVICE — FILTERLINE NASAL ADULT O2/CO2

## (undated) DEVICE — Device: Brand: DEFENDO AIR/WATER/SUCTION AND BIOPSY VALVE

## (undated) DEVICE — GAUZE SPONGES,USP TYPE VII GAUZE, 12 PLY: Brand: CURITY

## (undated) DEVICE — SOL  .9 1000ML BTL

## (undated) DEVICE — RECTAL CDS-LF: Brand: MEDLINE INDUSTRIES, INC.

## (undated) DEVICE — VIOLET BRAIDED (POLYGLACTIN 910), SYNTHETIC ABSORBABLE SUTURE: Brand: COATED VICRYL

## (undated) DEVICE — SNARE 9MM 230CM 2.4MM EXACTO

## (undated) DEVICE — ENDOSCOPY PACK - LOWER: Brand: MEDLINE INDUSTRIES, INC.

## (undated) DEVICE — KENDALL SCD EXPRESS SLEEVES, KNEE LENGTH, MEDIUM: Brand: KENDALL SCD

## (undated) DEVICE — STERILE POLYISOPRENE POWDER-FREE SURGICAL GLOVES: Brand: PROTEXIS

## (undated) DEVICE — 3M™ RED DOT™ MONITORING ELECTRODE WITH FOAM TAPE AND STICKY GEL, 50/BAG, 20/CASE, 72/PLT 2570: Brand: RED DOT™

## (undated) DEVICE — BRIEF INCONTINENCE ADULT 2XL

## (undated) DEVICE — 1200CC GUARDIAN II: Brand: GUARDIAN

## (undated) NOTE — LETTER
[facility] [address], [address]  Consent for Procedure/Sedation    Date:     Time:       1.  I authorize the performance upon Ugo Farah the following:cardiac catheterization, left ventricular cineangiography, bilateral Signature of person authorized                                     Relationship to  to consent for patient: _________________________ patient: ___________________    Witness: _______________________________ Date: _____________________    Printed: 11/5/2020

## (undated) NOTE — LETTER
Patient Name: Collette Jasso  YOB: 1955          MRN :  [id number]  Date:  9/6/2022  Referring Physician:  No ref. provider found    Discharge Summary  Pt has attended 8 visits in Physical Therapy. Insurance Primary/Secondary: MEDICARE / [address] [place] [address]     # Auth Visits: 8 POC            Subjective: The patient reports that he is feeling pretty good. He notes that he played golf yesterday and is a litle sore from that, but overall is feeling good. He notes that he is feeling benefit with the home exercises, and he feels like they are working as long as he does hem. He reports that he doesn't have much pain in the thoracic spine, and his L side of the neck has been better but a little sore this morning. Kami Tam states that he feels like he has more flexibility and ROM, he notes that he is comfortable with continuing on with his home program.    Pain: 2-3/10 in neck (sore/stiff) 9/6/2022      Objective:     Cervical AROM: (* denotes performed with pain)  Flexion: 54 (was 45)  Extension: 57 (was 54)  Sidebending: R 22 (was 18); L 16 (was 18)  Rotation: R 75; L 70 (was 76) (tight)    Lumbar AROM: (* denotes performed with pain)  Flexion: 85 (was 80)  Extension: 30 (was 25)  Sidebending: R WNL; L WNL  Rotation: R WNL; L WNL*      Palpation: there is point tenderness and increased soft tissue tension present to the L upper trapezius    First Rib Mobility: mobility is normal ROLDAN      Assessment: The patient has had some improvements in range of motion in all directions except L rotation. He does have persistent increased soft tissue tension and point tenderness to the left upper trapezius. The patient has demonstrated independence with his home program and has met long term goals, therefore he will be discharged from this episode of care for physical therapy. Goals:    (to be met in 8 visits)   1. The patient will demonstrate at least 30 deg pain free lumbar extension AROM   2.  The patient will report no increased soreness following yardwork MET  3. The patient will demonstrate pain free lateral cervical flexion bilaterally   4. The patient will report being able to turn the head to look behind the shoulder without pain MET (STRETCHING)  5. The patient will demonstrate independence and adherence to a home stretching program to maintain flexibility MET    FOTO: 73    Plan: The patient will be discharged from this episode of care for physical therapy at this time. Patient/Family/Caregiver was advised of these findings, precautions, and treatment options and has agreed to actively participate in planning and for this course of care. Thank you for your referral. If you have any questions, please contact me at Dept: [phone]. Sincerely,  Electronically signed by therapist: Chani Grajeda, PT       Certification From: 6/9/0680  To:12/5/2022            21st Century Cures Act Notice to Patient: Medical documents like this are made available to patients in the interest of transparency. However, be advised this is a medical document and it is intended as [id number] communication between your medical providers. This medical document may contain abbreviations, assessments, medical data, and results or other terms that are unfamiliar. Medical documents are intended to carry relevant information, facts as evident, and the clinical opinion of the practitioner. As such, this medical document may be written in language that appears blunt or direct. You are encouraged to contact your medical provider and/or [facility] if you have any questions about this medical document.

## (undated) NOTE — Clinical Note
Please adjust pressure from CPAP 10 to APAP 10-12cwp  Please order new supplies for patient- DME [facility] Schedule 3 month follow up (call Monday 11/11)

## (undated) NOTE — MR AVS SNAPSHOT
[facility]  [address] [facility] [address] [phone]               Thank you for choosing us for your health care visit with Ina Shaw PA-C.   We are glad to serve you and happy to provide you with this summa warm water. Suck on throat lozenges and cough drops to moisten your throat. · Cough medicines are available but it is unclear how effective they actually are.   · Take acetaminophen or an NSAID, such as ibuprofen to ease throat pain  Ease digestive problem This list is accurate as of: 1/22/17  2:11 PM.  Always use your most recent med list.                azithromycin 250 MG Tabs   Take two tablets by mouth today, then one tablet daily.    Commonly known as:  ZITHROMAX Z-SANDEEP           lisinopril 10 MG Tabs Eat plenty of low-fat dairy products High fat meats and dairy   Choose whole grain products Foods high in sodium   Water is best for hydration Fast food.    Eat at home when possible     Tips for increasing your physical activity – Adults who are physically

## (undated) NOTE — LETTER
[facility] [address], [address]  Consent for Procedure/Sedation    Date:     Time:       1.  I authorize the performance upon Ugo Farah the following:cardiac catheterization, left ventricular cineangiography, bilateral Signature of person authorized                                     Relationship to  to consent for patient: _________________________ patient: ___________________    Witness: _______________________________ Date: _____________________    Printed: 10/29/202

## (undated) NOTE — LETTER
Patient Name: Ying Olivera  YOB: 1955          MRN :  [id number]  Date:  7/7/2021  Referring Physician:  Vonnie Zazueta    Discharge Summary  Pt has attended 8 visits in Physical Therapy.  Rich reports feeling 75% improvement in elbow X___________________________________________________ Date____________________    Certification From: 4/0/5688  To:10/5/2021

## (undated) NOTE — LETTER
[address] [place] [address]   Date:   6/24/2022     Name:   Dane Fisher    YOB: 1955   MRN:   [id number]       [facility]? [place] the areas on your body where you feel the described sensations. Use the appropriate symbol. Princess Toscano the areas of radiation. Include all affected areas. Just to complete the picture, please draw in the face. ACHE:  ^ ^ ^   NUMBNESS:  0000   PINS & NEEDLES:  = = = =                              ^ ^ ^                       0000              = = = =                                    ^ ^ ^                       0000            = = = =      BURNING:  XXXX   STABBING: ////                  XXXX                ////                         XXXX          ////     Please warren the line below indicating your degree of pain right now  with 0 being no pain 10 being the worst pain possible.                                          0             1             2              3             4              5              6              7             8             9             10         Patient Signature:

## (undated) NOTE — LETTER
Patient Name: Perfecto Tilley  YOB: 1955          MRN number:  [id number]  Date:  11/28/2018  Referring Physician:  Alisa Faye          UPPER EXTREMITY EVALUATION:    Referring Physician: Dr. Bette Corrales  Diagnosis: Left RC dysfunction • Chest pain, unspecified    • High blood pressure    • Sprain of metacarpophalangeal (joint) of hand    • Sprain of neck    • Sprain of thoracic region    • Unspecified disorder of skin and subcutaneous tissue        ASSESSMENT  Rich presents to physical Cross-Body Adduction Test: R -, L +    Today’s Treatment and Response: Patient education provided on discussion on avoiding over head activities for the next 2 weeks to decrease irritation to the shoulder joint. I demo and explained purpose of each HEP.   P X___________________________________________________ Date____________________    Certification From: 84/16/4751  To:2/25/2019

## (undated) NOTE — LETTER
Patient Name: Desirae Roman  YOB: 1955          MRN number:  [id number]  Date:  1/3/2019  Referring Physician:  Lucy Flanagan     Progress Summary    Pt has attended 7, cancelled 1, and no shown 0 visits in Physical Therapy.    Subject such as carrying a bag of groceries (12 visits) MET 1/3/2019   · Pt will demonstrate increased mid/low trap strength to 4+/5 to promote improved shoulder mechanics and stabilization with ADLs such as lifting and reaching (12 visits) MET 1/3/2019        [place]

## (undated) NOTE — LETTER
17    Patient: Chris Chris  : 1955 Visit date: 2017    Dear  Dr. Delia Villegas MD,    Thank you for referring Chris Chris to my practice. Please find my assessment and plan below.                Assessment   Prolapsed inter

## (undated) NOTE — LETTER
10/03/17    Patient: Pradip Chan  : 1955 Visit date: 10/3/2017    Dear  Dr. Ayana Ojeda MD,    Thank you for referring Pradip Chan to my practice. Please find my assessment and plan below.                Assessment   Prolapsed intern This is not considered up-to-date for the procedure. This patient has a new onset of a prostate nodule. I discussed with the patient that this requires investigation. This investigation should be done prior to any excisional hemorrhoidectomy.   Once we